# Patient Record
Sex: MALE | Race: WHITE | Employment: UNEMPLOYED | URBAN - METROPOLITAN AREA
[De-identification: names, ages, dates, MRNs, and addresses within clinical notes are randomized per-mention and may not be internally consistent; named-entity substitution may affect disease eponyms.]

---

## 2018-04-14 ENCOUNTER — HOSPITAL ENCOUNTER (EMERGENCY)
Age: 48
Discharge: HOME | End: 2018-04-14
Attending: EMERGENCY MEDICINE
Payer: COMMERCIAL

## 2018-04-14 VITALS
HEART RATE: 88 BPM | SYSTOLIC BLOOD PRESSURE: 140 MMHG | TEMPERATURE: 98.3 F | RESPIRATION RATE: 16 BRPM | DIASTOLIC BLOOD PRESSURE: 85 MMHG | OXYGEN SATURATION: 98 %

## 2018-04-14 DIAGNOSIS — F20.9 SCHIZOPHRENIA, UNSPECIFIED TYPE (HCC): Primary | ICD-10-CM

## 2018-04-14 DIAGNOSIS — Z76.0 MEDICATION REFILL: ICD-10-CM

## 2018-04-14 PROCEDURE — 99282 EMERGENCY DEPT VISIT SF MDM: CPT

## 2018-04-14 RX ORDER — HALOPERIDOL 10 MG/1
10 TABLET ORAL
COMMUNITY
End: 2018-04-14

## 2018-04-14 RX ORDER — HYDROXYZINE 50 MG/1
100 TABLET, FILM COATED ORAL EVERY EVENING
Qty: 28 TAB | Refills: 0 | Status: SHIPPED | OUTPATIENT
Start: 2018-04-14 | End: 2018-04-28

## 2018-04-14 RX ORDER — HALOPERIDOL 10 MG/1
10 TABLET ORAL
Qty: 14 TAB | Refills: 0 | Status: SHIPPED | OUTPATIENT
Start: 2018-04-14 | End: 2018-04-28

## 2018-04-14 RX ORDER — LITHIUM CARBONATE 300 MG
300 TABLET ORAL 3 TIMES DAILY
COMMUNITY
End: 2018-04-14

## 2018-04-14 RX ORDER — BENZTROPINE MESYLATE 1 MG/1
1 TABLET ORAL 2 TIMES DAILY
COMMUNITY
End: 2018-04-14

## 2018-04-14 RX ORDER — OLANZAPINE 10 MG/1
10 TABLET ORAL
Qty: 14 TAB | Refills: 0 | Status: SHIPPED | OUTPATIENT
Start: 2018-04-14 | End: 2018-05-07 | Stop reason: SDUPTHER

## 2018-04-14 RX ORDER — BENZTROPINE MESYLATE 1 MG/1
1 TABLET ORAL 2 TIMES DAILY
Qty: 28 TAB | Refills: 0 | Status: SHIPPED | OUTPATIENT
Start: 2018-04-14 | End: 2018-04-28

## 2018-04-14 RX ORDER — RANITIDINE 150 MG/1
150 TABLET, FILM COATED ORAL 2 TIMES DAILY
COMMUNITY
End: 2018-04-14

## 2018-04-14 RX ORDER — HYDROXYZINE 50 MG/1
100 TABLET, FILM COATED ORAL EVERY EVENING
COMMUNITY
End: 2018-04-14

## 2018-04-14 RX ORDER — RANITIDINE 150 MG/1
150 TABLET, FILM COATED ORAL 2 TIMES DAILY
Qty: 28 TAB | Refills: 0 | Status: SHIPPED | OUTPATIENT
Start: 2018-04-14 | End: 2018-04-28

## 2018-04-14 RX ORDER — LITHIUM CARBONATE 300 MG
300 TABLET ORAL 3 TIMES DAILY
Qty: 42 TAB | Refills: 0 | Status: SHIPPED | OUTPATIENT
Start: 2018-04-14 | End: 2018-04-28

## 2018-04-14 RX ORDER — OLANZAPINE 10 MG/1
10 TABLET ORAL
COMMUNITY
End: 2018-04-14

## 2018-04-14 NOTE — BSMART NOTE
50 yo M seen in ED room 16 at the request of MICHELE Alonso to provide outpt psychiatric provider information. Smiles when approached, alert & O x 4, accompanied by [facility] mental health counselor. Released from skilled nursing month ago with 30 day medication supply that has run out as of today. Came to ED for med refills. Denies acute symptoms. Denies suicidal or homicidal ideation, denies A / V hallucinations. Impact worker states client does not yet have medicaid or an SSI check, but that he has been approved and they intend to follow-up with a private provider at that time. Provided written and verbal suggestion of [facility] availability. Impact worker states, \"[facility] takes too long, you go to [facility] and then nothing. \" She refused written referral information. DISPOSITION: Discussed with MICHELE Alonso who will attempt to provide written [facility] information upon discharge per ED. 10 day prescriptions will be written by ED provider. Will explain to client and Impact worker that ED does not provide routine primary psychiatric care and they will need to follow-up with PCP, psychiatrist or clinic of choice.

## 2018-04-14 NOTE — DISCHARGE INSTRUCTIONS
Medicine for Schizophrenia: Care Instructions  Your Care Instructions    Medicine is the best treatment for schizophrenia. But it can be hard to take the medicine. This may be because:  · You have severe side effects. · You don't believe you are ill. · You feel better. You may think you no longer need medicine. · You forget to take your medicine. This might be because of confused thinking or depression. · You have a drug or alcohol problem that gets in the way. · You don't want to be reminded that you have a mental health problem. Taking medicine every day reminds you. But if you stop taking your medicine, you probably will have a relapse. A relapse means your symptoms return or get worse after you have been feeling better. As long as you are taking medicines, you will need to see your doctor on a regular basis. You may need to go to a hospital while you are changing or stopping medicines. Follow-up care is a key part of your treatment and safety. Be sure to make and go to all appointments, and call your doctor if you are having problems. It's also a good idea to know your test results and keep a list of the medicines you take. What medicines are used for schizophrenia? Many types of medicines can help you. It might be best to use more than one, but it may take time to find which medicines work well for you. This may be frustrating. But your doctor and family can support you during this time. Medicines used most often include:  · First-generation antipsychotics. Examples are chlorpromazine, haloperidol (Haldol), and perphenazine. They are used to reduce anxiety and agitation. They also keep you from hearing or seeing things that aren't there (hallucinations) and from believing things that aren't true (delusions). · Second-generation antipsychotics. Examples are aripiprazole (Abilify) and risperidone (Risperdal).  These medicines keep you from hearing or seeing things that aren't there (hallucinations) and from believing things that aren't true (delusions). They also help the negative symptoms, like not caring about things or finding it hard to say how you feel. These medicines may have fewer side effects than first-generation medicines. These medicines sometimes have severe side effects. Always talk to your doctor about how they are working and how you are feeling. If you feel that a medicine isn't right for you, your doctor can help you find a new one. Don't stop taking your medicines unless you talk to your doctor. How can you care for yourself at home? Take your medicine  · Be safe with medicines. Take your medicines exactly as prescribed. Call your doctor if you think you are having a problem with your medicine. · If you are having trouble taking your medicines or feel you don't need to take them, talk to your doctor. Your doctor may be able to change the medicine or the amount you take. Ask about long-acting medicines  · Ask your doctor about long-acting medicines that are injected (shots). You get a shot every week or every few weeks. This may be a good choice because:  ¨ You have a set day and time to get the shot. ¨ If you don't show up for your shot, your doctor knows right away. ¨ The medicine stays in your body longer. If you are a little late for a shot, you have more time to get help before your symptoms return. ¨ You are not reminded every day that you have a mental health problem. ¨ You don't have to carry pills with you. Have a routine  · Make a schedule for taking your medicines. Follow it every day. · Identify things you do every day at the same time, such as brushing your teeth. Use these activities to help remind you to take your medicines. · Set your watch alarm or a kitchen timer to remind you when to take your medicines. Or ask a family member to help you remember to take your medicines.   · Keep the numbers for these national suicide hotlines: 8-830-390-TALK (3-906.693.2026) and 6-787-LIXCRDY (8-520.206.8619). If you or someone you know talks about suicide or about feeling hopeless, get help right away. Use a pillbox  · Use a plastic pillbox with dividers for each day's medicines. It can have a few or many compartments. Some have timers you can program. Choose one that fits your needs. · Put your pillbox in a place where it will remind you to take your medicines. For example, if you need to take medicine 3 times a day with meals, put those medicines in a pillbox near where you eat. · Keep one pill in its original bottle. Then if you forget what a pill is for, you can find the bottle it came from. When should you call for help? Call 911 anytime you think you may need emergency care. For example, call if:  ? · You are thinking about suicide or are threatening suicide. ? · You feel you cannot stop from hurting yourself or someone else. ? · You hear voices that tell you to hurt yourself or someone else or to do something illegal, such as destroy property or steal.   ?Call your doctor now or seek immediate medical care if:  ? · You show warning signs of suicide, such as talking about death or spending long periods of time alone. ? · You hear voices. ? · You think someone is trying to harm you. ? · You cannot concentrate or are easily confused. ? · You are drinking a lot of alcohol or using illegal drugs. ? · You have a hard time taking care of basic needs, such as grooming. ? · You have signs of neuroleptic malignant syndrome, a side effect of a medicine you may be taking. Signs include:  ¨ A fever of 102°F to 103°F.  ¨ A fast or irregular heartbeat. ¨ Rapid breathing. ¨ Severe sweating. ? · You have signs of tardive dyskinesia, a side effect of a medicine you may be taking. These include:  ¨ Lip-smacking or continuous chewing. ¨ Tongue-twitching or thrusting the tongue out of the mouth. ¨ Quick and jerky movements (tics) of the head. ? Watch closely for changes in your health, and be sure to contact your doctor if:  ? · Your symptoms come back or are getting worse after you have been getting better. ? · You cannot go to your counseling sessions. ? · You are not taking your medicines or you are thinking about not taking them. Where can you learn more? Go to http://melony-venu.info/. Enter B567 in the search box to learn more about \"Medicine for Schizophrenia: Care Instructions. \"  Current as of: May 12, 2017  Content Version: 11.4  © 9299-1006 [employer]. Care instructions adapted under license by [employer] (which disclaims liability or warranty for this information). If you have questions about a medical condition or this instruction, always ask your healthcare professional. [address] any warranty or liability for your use of this information.

## 2018-04-14 NOTE — ED PROVIDER NOTES
EMERGENCY DEPARTMENT HISTORY AND PHYSICAL EXAM    9:07 AM      Date: 4/14/2018  Patient Name: Rogers Solomon    History of Presenting Illness     Chief Complaint   Patient presents with    Medication Refill         History Provided By: Patient    Chief Complaint: medication refill  Duration:  N/A  Timing:  N/A  Location: n/a  Quality: n/a  Severity: N/A  Modifying Factors: none  Associated Symptoms: denies any other associated signs or symptoms      Additional History (Context): Rogers Solomon is a 51 y.o. male with schizophrenia who presents for refill on his medications. Pt was just released from senior care and ran out of his medications today. Pt had today's dose. Pt needs Atarax, Cogentin, Zyprexa, Lithium, Haldol, Zantac. Pt is awaiting approval for Medicaid to get a PCP and outpatient psychiatrist. Pt denies any medical complaints. As the patient is without physical symptoms or complaints of pain, there is no severity of pain, quality of pain, duration, modifying factors, or associated signs and symptoms regarding the pt's presenting complaint. No other concerns or symptoms at this time. Denies suicidal ideation, hallucination, homicidal ideation. PCP: None    Current Outpatient Prescriptions   Medication Sig Dispense Refill    hydrOXYzine HCl (ATARAX) 50 mg tablet Take 100 mg by mouth every evening.  benztropine (COGENTIN) 1 mg tablet Take 1 mg by mouth two (2) times a day.  OLANZapine (ZYPREXA) 10 mg tablet Take 10 mg by mouth nightly.  lithium carbonate 300 mg tablet Take 300 mg by mouth three (3) times daily.  raNITIdine (ZANTAC) 150 mg tablet Take 150 mg by mouth two (2) times a day.  haloperidol (HALDOL) 10 mg tablet Take 10 mg by mouth nightly. Past History     Past Medical History:  Past Medical History:   Diagnosis Date    Schizophrenia [facility])        Past Surgical History:  History reviewed. No pertinent surgical history. Family History:  History reviewed. No pertinent family history. Social History:  Social History   Substance Use Topics    Smoking status: Current Every Day Smoker     Packs/day: 1.00    Smokeless tobacco: Never Used    Alcohol use No       Allergies:  No Known Allergies      Review of Systems       Review of Systems   Constitutional: Negative for fever. HENT: Negative for facial swelling. Eyes: Negative for visual disturbance. Respiratory: Negative for shortness of breath. Cardiovascular: Negative for chest pain. Gastrointestinal: Negative for abdominal pain. Genitourinary: Negative for dysuria. Musculoskeletal: Negative for neck pain. Skin: Negative for rash. Neurological: Negative for dizziness. Psychiatric/Behavioral: Negative for confusion, hallucinations and suicidal ideas. All other systems reviewed and are negative. Physical Exam   There were no vitals taken for this visit. Physical Exam   Constitutional: He appears well-developed and well-nourished. No distress. HENT:   Head: Normocephalic and atraumatic. Eyes: Conjunctivae are normal.   Neck: Normal range of motion. Neck supple. Cardiovascular: Normal rate. Pulmonary/Chest: Effort normal.   Abdominal: Soft. Musculoskeletal: Normal range of motion. Neurological: He is alert. Skin: Skin is warm and dry. He is not diaphoretic. Psychiatric: He has a normal mood and affect. His behavior is normal. Judgment and thought content normal.   Nursing note and vitals reviewed. Diagnostic Study Results     Labs -  No results found for this or any previous visit (from the past 12 hour(s)). Radiologic Studies -   No orders to display         Medical Decision Making   I am the first provider for this patient. I reviewed the vital signs, available nursing notes, past medical history, past surgical history, family history and social history. Vital Signs-Reviewed the patient's vital signs.     Records Reviewed: Nursing Notes (Time of Review: 9:07 AM)    ED Course: Progress Notes, Reevaluation, and Consults:  H/o schizophrenia, just released from nursing home, needs meds refilled until appt with psych. Does not have appt at this time. Consulted crisis to get appt with [facility] until medicaid approved. Denies SI/HI. No medical complaints. Provider Notes (Medical Decision Making):   Crisis has discussed [facility] options with patient and  but they refuse. Informed them that it is necessary to have a PCP or psychiatrist prescribe his meds and these are not typically refilled in the ED. We will refill them today, but he will need a PCP or psych appt for his next refill. Urged them to consider [facility]. Discussed treatment plan, return precautions, symptomatic relief, and expected time to improvement. All questions answered. Patient is stable for discharge and outpatient management. Diagnosis     Clinical Impression:   1. Schizophrenia, unspecified type ([facility] [address]    2. Medication refill        Disposition: home    Follow-up Information     None           Patient's Medications   Start Taking    No medications on file   Continue Taking    BENZTROPINE (COGENTIN) 1 MG TABLET    Take 1 mg by mouth two (2) times a day. HALOPERIDOL (HALDOL) 10 MG TABLET    Take 10 mg by mouth nightly. HYDROXYZINE HCL (ATARAX) 50 MG TABLET    Take 100 mg by mouth every evening. LITHIUM CARBONATE 300 MG TABLET    Take 300 mg by mouth three (3) times daily. OLANZAPINE (ZYPREXA) 10 MG TABLET    Take 10 mg by mouth nightly. RANITIDINE (ZANTAC) 150 MG TABLET    Take 150 mg by mouth two (2) times a day.    These Medications have changed    No medications on file   Stop Taking    No medications on file     _______________________________    Attestations:  [id number] [facility] acting as a scribe for and in the presence of ARIE/DEVEN Luciano      April 14, 2018 at 9:07 AM       Provider Attestation:      I personally performed the services described in the documentation, reviewed the documentation, as recorded by the scribe in my presence, and it accurately and completely records my words and actions.  April 14, 2018 at 9:07 AM - Billy Garcia PA-C    _______________________________

## 2018-05-07 ENCOUNTER — OFFICE VISIT (OUTPATIENT)
Dept: FAMILY MEDICINE CLINIC | Age: 48
End: 2018-05-07

## 2018-05-07 ENCOUNTER — HOSPITAL ENCOUNTER (OUTPATIENT)
Dept: LAB | Age: 48
Discharge: HOME | End: 2018-05-07

## 2018-05-07 VITALS
HEIGHT: 73 IN | WEIGHT: 163.8 LBS | BODY MASS INDEX: 21.71 KG/M2 | HEART RATE: 102 BPM | RESPIRATION RATE: 16 BRPM | TEMPERATURE: 97.6 F | SYSTOLIC BLOOD PRESSURE: 119 MMHG | OXYGEN SATURATION: 99 % | DIASTOLIC BLOOD PRESSURE: 72 MMHG

## 2018-05-07 DIAGNOSIS — Z76.0 MEDICATION REFILL: ICD-10-CM

## 2018-05-07 DIAGNOSIS — Z59.9 FINANCIAL DIFFICULTIES: ICD-10-CM

## 2018-05-07 DIAGNOSIS — F20.9 SCHIZOPHRENIA, UNSPECIFIED TYPE (HCC): ICD-10-CM

## 2018-05-07 DIAGNOSIS — Z76.89 ENCOUNTER TO ESTABLISH CARE: Primary | ICD-10-CM

## 2018-05-07 DIAGNOSIS — Z72.0 TOBACCO USER: ICD-10-CM

## 2018-05-07 DIAGNOSIS — Z76.89 ENCOUNTER TO ESTABLISH CARE: ICD-10-CM

## 2018-05-07 DIAGNOSIS — Z86.59 H/O BIPOLAR DISORDER: ICD-10-CM

## 2018-05-07 LAB
ALBUMIN SERPL-MCNC: 4 G/DL (ref 3.4–5)
ALBUMIN/GLOB SERPL: 1.4 {RATIO} (ref 0.8–1.7)
ALP SERPL-CCNC: 83 U/L (ref 45–117)
ALT SERPL-CCNC: 22 U/L (ref 16–61)
AMPHET UR QL SCN: NEGATIVE
ANION GAP SERPL CALC-SCNC: 7 MMOL/L (ref 3–18)
AST SERPL-CCNC: 15 U/L (ref 15–37)
BARBITURATES UR QL SCN: NEGATIVE
BASOPHILS # BLD: 0 K/UL (ref 0–0.1)
BASOPHILS NFR BLD: 0 % (ref 0–2)
BENZODIAZ UR QL: NEGATIVE
BILIRUB SERPL-MCNC: 0.6 MG/DL (ref 0.2–1)
BUN SERPL-MCNC: 9 MG/DL (ref 7–18)
BUN/CREAT SERPL: 10 (ref 12–20)
CALCIUM SERPL-MCNC: 8.3 MG/DL (ref 8.5–10.1)
CANNABINOIDS UR QL SCN: NEGATIVE
CHLORIDE SERPL-SCNC: 108 MMOL/L (ref 100–108)
CHOLEST SERPL-MCNC: 152 MG/DL
CO2 SERPL-SCNC: 27 MMOL/L (ref 21–32)
COCAINE UR QL SCN: POSITIVE
CREAT SERPL-MCNC: 0.93 MG/DL (ref 0.6–1.3)
DIFFERENTIAL METHOD BLD: ABNORMAL
EOSINOPHIL # BLD: 0.3 K/UL (ref 0–0.4)
EOSINOPHIL NFR BLD: 4 % (ref 0–5)
ERYTHROCYTE [DISTWIDTH] IN BLOOD BY AUTOMATED COUNT: 12.9 % (ref 11.6–14.5)
EST. AVERAGE GLUCOSE BLD GHB EST-MCNC: NORMAL MG/DL
ETHANOL SERPL-MCNC: <3 MG/DL (ref 0–3)
GLOBULIN SER CALC-MCNC: 2.9 G/DL (ref 2–4)
GLUCOSE SERPL-MCNC: 123 MG/DL (ref 74–99)
HBA1C MFR BLD: 4.6 % (ref 4.2–5.6)
HCT VFR BLD AUTO: 38.8 % (ref 36–48)
HDLC SERPL-MCNC: 55 MG/DL (ref 40–60)
HDLC SERPL: 2.8 {RATIO} (ref 0–5)
HDSCOM,HDSCOM: ABNORMAL
HGB BLD-MCNC: 13.8 G/DL (ref 13–16)
LDLC SERPL CALC-MCNC: 79.4 MG/DL (ref 0–100)
LIPID PROFILE,FLP: NORMAL
LITHIUM SERPL-SCNC: <0.2 MMOL/L (ref 0.6–1.2)
LYMPHOCYTES # BLD: 1.4 K/UL (ref 0.9–3.6)
LYMPHOCYTES NFR BLD: 20 % (ref 21–52)
MCH RBC QN AUTO: 31.9 PG (ref 24–34)
MCHC RBC AUTO-ENTMCNC: 35.6 G/DL (ref 31–37)
MCV RBC AUTO: 89.8 FL (ref 74–97)
METHADONE UR QL: NEGATIVE
MONOCYTES # BLD: 0.6 K/UL (ref 0.05–1.2)
MONOCYTES NFR BLD: 8 % (ref 3–10)
NEUTS SEG # BLD: 4.6 K/UL (ref 1.8–8)
NEUTS SEG NFR BLD: 68 % (ref 40–73)
OPIATES UR QL: NEGATIVE
PCP UR QL: NEGATIVE
PLATELET # BLD AUTO: 159 K/UL (ref 135–420)
PMV BLD AUTO: 9.4 FL (ref 9.2–11.8)
POTASSIUM SERPL-SCNC: 3.9 MMOL/L (ref 3.5–5.5)
PROT SERPL-MCNC: 6.9 G/DL (ref 6.4–8.2)
PSA SERPL-MCNC: 0.6 NG/ML (ref 0–4)
RBC # BLD AUTO: 4.32 M/UL (ref 4.7–5.5)
SODIUM SERPL-SCNC: 142 MMOL/L (ref 136–145)
TRIGL SERPL-MCNC: 88 MG/DL (ref ?–150)
TSH SERPL DL<=0.05 MIU/L-ACNC: 0.75 UIU/ML (ref 0.36–3.74)
VLDLC SERPL CALC-MCNC: 17.6 MG/DL
WBC # BLD AUTO: 6.8 K/UL (ref 4.6–13.2)

## 2018-05-07 PROCEDURE — 87389 HIV-1 AG W/HIV-1&-2 AB AG IA: CPT | Performed by: NURSE PRACTITIONER

## 2018-05-07 PROCEDURE — 80061 LIPID PANEL: CPT | Performed by: NURSE PRACTITIONER

## 2018-05-07 PROCEDURE — 85025 COMPLETE CBC W/AUTO DIFF WBC: CPT | Performed by: NURSE PRACTITIONER

## 2018-05-07 PROCEDURE — 80178 ASSAY OF LITHIUM: CPT | Performed by: NURSE PRACTITIONER

## 2018-05-07 PROCEDURE — 87661 TRICHOMONAS VAGINALIS AMPLIF: CPT | Performed by: NURSE PRACTITIONER

## 2018-05-07 PROCEDURE — 84443 ASSAY THYROID STIM HORMONE: CPT | Performed by: NURSE PRACTITIONER

## 2018-05-07 PROCEDURE — 83036 HEMOGLOBIN GLYCOSYLATED A1C: CPT | Performed by: NURSE PRACTITIONER

## 2018-05-07 PROCEDURE — 80074 ACUTE HEPATITIS PANEL: CPT | Performed by: NURSE PRACTITIONER

## 2018-05-07 PROCEDURE — 80307 DRUG TEST PRSMV CHEM ANLYZR: CPT | Performed by: NURSE PRACTITIONER

## 2018-05-07 PROCEDURE — 80053 COMPREHEN METABOLIC PANEL: CPT | Performed by: NURSE PRACTITIONER

## 2018-05-07 PROCEDURE — 84153 ASSAY OF PSA TOTAL: CPT | Performed by: NURSE PRACTITIONER

## 2018-05-07 RX ORDER — CALCIUM POLYCARBOPHIL 625 MG
625 TABLET ORAL 2 TIMES DAILY
COMMUNITY
End: 2019-08-09

## 2018-05-07 RX ORDER — LITHIUM CARBONATE 300 MG/1
600 CAPSULE ORAL DAILY
COMMUNITY
End: 2018-05-07 | Stop reason: SDUPTHER

## 2018-05-07 RX ORDER — HALOPERIDOL 20 MG/1
20 TABLET ORAL
Qty: 30 TAB | Refills: 0 | Status: SHIPPED | OUTPATIENT
Start: 2018-05-07 | End: 2019-08-09

## 2018-05-07 RX ORDER — BENZTROPINE MESYLATE 1 MG/1
1 TABLET ORAL EVERY EVENING
Qty: 30 TAB | Refills: 0 | Status: SHIPPED | OUTPATIENT
Start: 2018-05-07 | End: 2019-08-09

## 2018-05-07 RX ORDER — HALOPERIDOL 10 MG/1
20 TABLET ORAL DAILY
COMMUNITY
End: 2018-05-07 | Stop reason: SDUPTHER

## 2018-05-07 RX ORDER — HYDROXYZINE 50 MG/1
100 TABLET, FILM COATED ORAL DAILY
COMMUNITY
End: 2018-05-07 | Stop reason: SDUPTHER

## 2018-05-07 RX ORDER — OLANZAPINE 10 MG/1
10 TABLET ORAL
Qty: 90 TAB | Refills: 3 | Status: ON HOLD | COMMUNITY
Start: 2018-05-07 | End: 2019-08-09 | Stop reason: SDUPTHER

## 2018-05-07 RX ORDER — OLANZAPINE 10 MG/1
10 TABLET ORAL
Qty: 90 TAB | Refills: 0 | Status: SHIPPED | COMMUNITY
Start: 2018-05-07 | End: 2019-08-09

## 2018-05-07 RX ORDER — BENZTROPINE MESYLATE 1 MG/1
TABLET ORAL DAILY
COMMUNITY
End: 2018-05-07 | Stop reason: SDUPTHER

## 2018-05-07 RX ORDER — RANITIDINE 150 MG/1
150 TABLET, FILM COATED ORAL DAILY
COMMUNITY
End: 2019-08-09

## 2018-05-07 RX ORDER — LITHIUM CARBONATE 300 MG/1
600 CAPSULE ORAL EVERY EVENING
Qty: 60 CAP | Refills: 0 | Status: SHIPPED | OUTPATIENT
Start: 2018-05-07 | End: 2019-08-09

## 2018-05-07 RX ORDER — HYDROXYZINE 50 MG/1
100 TABLET, FILM COATED ORAL EVERY EVENING
Qty: 60 TAB | Refills: 0 | Status: SHIPPED | OUTPATIENT
Start: 2018-05-07 | End: 2019-08-09

## 2018-05-07 SDOH — ECONOMIC STABILITY - INCOME SECURITY: PROBLEM RELATED TO HOUSING AND ECONOMIC CIRCUMSTANCES, UNSPECIFIED: Z59.9

## 2018-05-07 NOTE — PROGRESS NOTES
[address]  [facility], [address] [facility]  [phone] office/[phone] fax      5/7/2018    Reason for visit:   Chief Complaint   Patient presents with    Establish Care    Bipolar    Behavioral Problem     schizophrenia dx at age 7 been lithium 21 years, haldol 15 years, zyprexa ~ unknown    Other     schizophrenia    Medication Refill    Depression    Other     Released from [facility] x 2 weeks ago and served 4 years of 5 year senior living term. Has 30 day supply of meds       Patient: Praful Coleman, 1970, [id number]       Primary MD: Manny Bro NP    Subjective:   Praful Coelman, a 51 y.o. male with pmhx of polysubstance abuse, Schizophrenia, Bipolar, and Tobacco use who presents today to establish [facility]. The patient was released from senior living 2 weeks ago after serving 4 years. He presents today needing refills of his medications, however he cannot afford them. Pt reports he has tried to contact [facility] to see a psychiatrist but they are not accepting patients at this time. He had a psychiatrist in senior living but lacks follow up outside due to accessibility. He presents today for a rrefill. He had been doing well on his previously prescribed Lithium, Haldol, Atarax, Zyprexa and congentin. Congentin was added to his regimen given he was having numbness in his hands, denies this at this time or any other side effects from medications. He reports symptoms were stable on meds but since being off he has been hearing more voices telling him \"He's no good and to kill himself\", he also reports visual hallucinations in addition to the auditory hallucinations. He reports sleep not being adequate, but has good appetite. He lives with his roommate and does not work as he is on disability. He has a fixed income and reports low dispensable income after paying his rent.  He denies any current SI/HI and reports he is aware to go to ED for any emergency situations or if he feels suicidal.     Records from halfway were reviewed to make sure med list was accurate. HPI    PHQ over the last two weeks 5/7/2018   PHQ Not Done Active Diagnosis of Depression or Bipolar Disorder   Little interest or pleasure in doing things Several days   Feeling down, depressed or hopeless Several days   Total Score PHQ 2 2       Past Medical History:   Diagnosis Date    ADHD     Bipolar affective ([facility]     Schizophrenia ([facility]     Smoker        History reviewed. No pertinent surgical history. Social History     Social History    Marital status: SINGLE     Spouse name: N/A    Number of children: 0    Years of education: GED     Occupational History    Unemployed      Social History Main Topics    Smoking status: Current Every Day Smoker     Packs/day: 1.00    Smokeless tobacco: Never Used    Alcohol use No    Drug use: No    Sexual activity: Not Currently     Partners: Female     Other Topics Concern     Service No    Blood Transfusions No    Caffeine Concern No    Occupational Exposure No    Hobby Hazards No    Sleep Concern No     only problem sleeping when out of medication    Stress Concern No    Weight Concern No    Special Diet No    Back Care No    Exercise Yes     \"I walk everywhere\"    Bike Helmet No    Seat Belt Yes    Self-Exams No     Social History Narrative    Lives with roomate       No Known Allergies    No current outpatient prescriptions on file prior to visit. No current facility-administered medications on file prior to visit. Review of Systems   Constitutional: Negative. HENT: Negative. Eyes: Negative. Respiratory: Negative. Cardiovascular: Negative. Gastrointestinal: Negative. Genitourinary: Negative. Musculoskeletal: Negative. Skin: Negative. Neurological: Negative. Endo/Heme/Allergies: Negative. Psychiatric/Behavioral: Positive for depression and hallucinations.  Negative for memory loss, substance abuse and suicidal ideas. The patient has insomnia. The patient is not nervous/anxious. Objective:   Visit Vitals    /72 (BP 1 Location: Left arm, BP Patient Position: Sitting)    Pulse (!) 102    Temp 97.6 °F (36.4 °C) (Oral)    Resp 16    Ht 6' 1\" (1.854 m)    Wt 163 lb 12.8 oz (74.3 kg)    SpO2 99%    BMI 21.61 kg/m2      Wt Readings from Last 3 Encounters:   05/07/18 163 lb 12.8 oz (74.3 kg)     No results found for: GLU, GLUCPOC    Physical Exam   Constitutional: He is oriented to person, place, and time. He appears well-developed and well-nourished. HENT:   Head: Normocephalic. Eyes: Pupils are equal, round, and reactive to light. Neck: Normal range of motion. Neck supple. No JVD present. Carotid bruit is not present. No thyromegaly present. Cardiovascular: Normal rate and regular rhythm. No murmur heard. Pulmonary/Chest: Effort normal and breath sounds normal. No respiratory distress. He has no wheezes. Abdominal: Soft. Bowel sounds are normal. He exhibits no distension. There is no tenderness. Musculoskeletal: Normal range of motion. He exhibits no edema or deformity. Neurological: He is alert and oriented to person, place, and time. He has normal reflexes. Skin: Skin is warm and dry. Psychiatric: He has a normal mood and affect. His speech is normal and behavior is normal. Judgment and thought content normal. Cognition and memory are normal.   Vitals reviewed. Assessment:    Aure Chandler who has risk factors including (see above previous medical hx) and:       ICD-10-CM ICD-9-CM    1.  Encounter to establish care Z76.89 V65.8 DRUG SCREEN, URINE      TSH 3RD GENERATION      LIPID PANEL      HEPATITIS PANEL, ACUTE      CBC WITH AUTOMATED DIFF      METABOLIC PANEL, COMPREHENSIVE      PSA, DIAGNOSTIC (PROSTATE SPECIFIC AG)      HIV 1/2 AG/AB, 4TH GENERATION,W RFLX CONFIRM      ETHYL ALCOHOL      CHLAMYDIA/NEISSERIA/TRICHOMONAS AMP HEMOGLOBIN A1C WITH EAG      LITHIUM   2. Schizophrenia, unspecified type (HCC) F20.9 295.90 REFERRAL TO PSYCHIATRY      REFERRAL TO BEHAVIORAL HEALTH      OLANZapine (ZYPREXA) 10 mg tablet      OLANZapine (ZYPREXA) 10 mg tablet      lithium carbonate 300 mg capsule      hydrOXYzine HCl (ATARAX) 50 mg tablet      haloperidol (HALDOL) 20 mg tablet      benztropine (COGENTIN) 1 mg tablet   3. H/O bipolar disorder Z86.59 V11.1 REFERRAL TO PSYCHIATRY      REFERRAL TO BEHAVIORAL HEALTH      OLANZapine (ZYPREXA) 10 mg tablet      OLANZapine (ZYPREXA) 10 mg tablet      lithium carbonate 300 mg capsule      hydrOXYzine HCl (ATARAX) 50 mg tablet      haloperidol (HALDOL) 20 mg tablet      benztropine (COGENTIN) 1 mg tablet   4. Medication refill Z76.0 V68.1 OLANZapine (ZYPREXA) 10 mg tablet      OLANZapine (ZYPREXA) 10 mg tablet      lithium carbonate 300 mg capsule      hydrOXYzine HCl (ATARAX) 50 mg tablet      haloperidol (HALDOL) 20 mg tablet      benztropine (COGENTIN) 1 mg tablet   5. Tobacco user Z72.0 305.1    6. Financial difficulties Z59.8 V60.2    1. Encounter to establish care  - DRUG SCREEN, URINE; Future  - TSH 3RD GENERATION; Future  - LIPID PANEL; Future  - HEPATITIS PANEL, ACUTE; Future  - CBC WITH AUTOMATED DIFF; Future  - METABOLIC PANEL, COMPREHENSIVE; Future  - PROSTATE SPECIFIC AG; Future  - HIV 1/2 AG/AB, 4TH GENERATION,W RFLX CONFIRM; Future  - ETHYL ALCOHOL; Future  - CHLAMYDIA/NEISSERIA/TRICHOMONAS AMP; Future  - HEMOGLOBIN A1C WITH EAG; Future  - LITHIUM; Future    2.  Schizophrenia, unspecified type [facility])  The patient was identified to have a behavioral issue and counseling was suggested and verbal and written instruction was provided on how to seek assistance for any urgent issues away from the office such as contacting the Suicide Prevention Hotline, contacting the local [employer] in [place], contacting the local police department or coming directly to [facility] Department M-F 8am to 5pm or the Emergency Department 24 hours a day. The patient was also informed of our in house mental health Nurse Practitioner Ms. Joe Kwok that is available for counseling and medication management. The patient understands that any medications provided to them for behavioral issues are with the request that behavioral counseling also commence for the synergistic effect to reduce the symptoms and to provide coping skills that are currently lacking.   - The patient to f/u with In house mental health NP for counseling while continuing to try to get into [facility]   - Given information on GAP and APA  - REFERRAL TO PSYCHIATRY  - REFERRAL TO BEHAVIORAL HEALTH  - OLANZapine (ZYPREXA) 10 mg tablet; Take 1 Tab by mouth nightly. Dispense: 90 Tab; Refill: 3  - OLANZapine (ZYPREXA) 10 mg tablet; Take 1 Tab by mouth nightly. Dispense: 90 Tab; Refill: 0  - lithium carbonate 300 mg capsule; Take 2 Caps by mouth every evening. Dispense: 60 Cap; Refill: 0  - hydrOXYzine HCl (ATARAX) 50 mg tablet; Take 2 Tabs by mouth every evening. Dispense: 60 Tab; Refill: 0  - haloperidol (HALDOL) 20 mg tablet; Take 1 Tab by mouth nightly. Dispense: 30 Tab; Refill: 0  - benztropine (COGENTIN) 1 mg tablet; Take 1 Tab by mouth every evening. Dispense: 30 Tab; Refill: 0    3. H/O bipolar disorder  - REFERRAL TO PSYCHIATRY  - REFERRAL TO BEHAVIORAL HEALTH  - OLANZapine (ZYPREXA) 10 mg tablet; Take 1 Tab by mouth nightly. Dispense: 90 Tab; Refill: 3  - OLANZapine (ZYPREXA) 10 mg tablet; Take 1 Tab by mouth nightly. Dispense: 90 Tab; Refill: 0  - lithium carbonate 300 mg capsule; Take 2 Caps by mouth every evening. Dispense: 60 Cap; Refill: 0  - hydrOXYzine HCl (ATARAX) 50 mg tablet; Take 2 Tabs by mouth every evening. Dispense: 60 Tab; Refill: 0  - haloperidol (HALDOL) 20 mg tablet; Take 1 Tab by mouth nightly. Dispense: 30 Tab; Refill: 0  - benztropine (COGENTIN) 1 mg tablet;  Take 1 Tab by mouth every evening. Dispense: 30 Tab; Refill: 0    4. Medication refill  - Given 30 day supply of refills until seen by mental health provider  - Lithium level obtained. - OLANZapine (ZYPREXA) 10 mg tablet; Take 1 Tab by mouth nightly. Dispense: 90 Tab; Refill: 3  - OLANZapine (ZYPREXA) 10 mg tablet; Take 1 Tab by mouth nightly. Dispense: 90 Tab; Refill: 0  - lithium carbonate 300 mg capsule; Take 2 Caps by mouth every evening. Dispense: 60 Cap; Refill: 0  - hydrOXYzine HCl (ATARAX) 50 mg tablet; Take 2 Tabs by mouth every evening. Dispense: 60 Tab; Refill: 0  - haloperidol (HALDOL) 20 mg tablet; Take 1 Tab by mouth nightly. Dispense: 30 Tab; Refill: 0  - benztropine (COGENTIN) 1 mg tablet; Take 1 Tab by mouth every evening. Dispense: 30 Tab; Refill: 0    5. Tobacco user  -Counseled patient on the dangers of tobacco use, and was advised to quit. Reviewed strategies to maximize success, including the use of Chantix. Discussed the risks of continued tobacco use such as elevated blood pressure, vascular irritation with increased incidence of CVD with stroke or MI and PVD causing claudication, lung damage that could lead to COPD, cancer and death. Encouraged an approach to find a few healthy habits, write them down then plan to decrease their cigarette use by one each week till they are gone all together and to plan for stress that may cause them to want to restart and how to prevent it by having new coping mechanisms in place. 1-800-QUIT-NOW provided for counseling       6. Financial difficulties  - Given one time [employer] voucher to cover 30 days worth of medications. Discussed with patient that we are not able to do this on a regular basis. Written instructions followed our verbal discussion of all information discussed above, pending tests ordered and future goals/plans.  Patient expressed understanding of current diagnosis, planned testing, follow up and if needed to contact the office for any questions or concerns prior to the next visit. Plan:   Reviewed medication and completed the medication reconciliation with the patient. Reviewed side effects of medications with the patient. Questions were answered and patient verb understanding. Pt is a 50 yo  male. See Med hx for details. Pt in the office today to establish care, medication reconciliation. Labs obtained to establish baseline, evaluate metabolic health, nutritional status, vitamin deficiencies and screening for at risk items based on the demographics of the patient, previous medical history and current social practices.  Will contact the patient in when all labs are resulted by phone to review and make lifestyle and medication recommendations. Follow up labs will be completed to monitor improvement prior to their next visit.       Orders Placed This Encounter    DRUG SCREEN, URINE     Standing Status:   Future     Standing Expiration Date:   11/6/2018    TSH 3RD GENERATION     Standing Status:   Future     Number of Occurrences:   1     Standing Expiration Date:   11/6/2018    LIPID PANEL     Standing Status:   Future     Number of Occurrences:   1     Standing Expiration Date:   5/8/2019    HEPATITIS PANEL, ACUTE     Standing Status:   Future     Number of Occurrences:   1     Standing Expiration Date:   11/6/2018    CBC WITH AUTOMATED DIFF     Standing Status:   Future     Number of Occurrences:   1     Standing Expiration Date:   38/9/7247    METABOLIC PANEL, COMPREHENSIVE     Standing Status:   Future     Number of Occurrences:   1     Standing Expiration Date:   11/6/2018    PROSTATE SPECIFIC AG     Standing Status:   Future     Number of Occurrences:   1     Standing Expiration Date:   11/6/2018    HIV 1/2 AG/AB, 4TH GENERATION,W RFLX CONFIRM     Standing Status:   Future     Number of Occurrences:   1     Standing Expiration Date:   11/6/2018    ETHYL ALCOHOL     Standing Status:   Future     Number of Occurrences:   1     Standing Expiration Date:   11/6/2018    CHLAMYDIA/NEISSERIA/TRICHOMONAS AMP     Standing Status:   Future     Number of Occurrences:   1     Standing Expiration Date:   11/7/2018     Order Specific Question:   Specimen type/source     Answer:   Urine [258]    HEMOGLOBIN A1C WITH EAG     Standing Status:   Future     Number of Occurrences:   1     Standing Expiration Date:   5/8/2019    LITHIUM     Standing Status:   Future     Number of Occurrences:   1     Standing Expiration Date:   11/7/2018    REFERRAL TO PSYCHIATRY     Referral Priority:   Routine     Referral Type:   Behavioral Health     Referral Reason:   Specialty Services Required     Referred to Provider:   Aaron Dubose NP    REFERRAL TO BEHAVIORAL HEALTH     Referral Priority:   Routine     Referral Type:   Behavioral Health     Referral Reason:   Specialty Services Required     Requested Specialty:   Behavioral Health    OLANZapine (ZYPREXA) 10 mg tablet     Sig: Take 1 Tab by mouth nightly. Dispense:  90 Tab     Refill:  3    OLANZapine (ZYPREXA) 10 mg tablet     Sig: Take 1 Tab by mouth nightly. Dispense:  90 Tab     Refill:  0    lithium carbonate 300 mg capsule     Sig: Take 2 Caps by mouth every evening. Dispense:  60 Cap     Refill:  0    hydrOXYzine HCl (ATARAX) 50 mg tablet     Sig: Take 2 Tabs by mouth every evening. Dispense:  60 Tab     Refill:  0    haloperidol (HALDOL) 20 mg tablet     Sig: Take 1 Tab by mouth nightly. Dispense:  30 Tab     Refill:  0    benztropine (COGENTIN) 1 mg tablet     Sig: Take 1 Tab by mouth every evening. Dispense:  30 Tab     Refill:  0     Current Outpatient Prescriptions   Medication Sig Dispense Refill    calcium polycarbophil (FIBER-LAX) 625 mg tablet Take 625 mg by mouth two (2) times a day.  raNITIdine (ZANTAC) 150 mg tablet Take 150 mg by mouth daily.  OLANZapine (ZYPREXA) 10 mg tablet Take 1 Tab by mouth nightly.  90 Tab 3    OLANZapine (ZYPREXA) 10 mg tablet Take 1 Tab by mouth nightly. 90 Tab 0    lithium carbonate 300 mg capsule Take 2 Caps by mouth every evening. 60 Cap 0    hydrOXYzine HCl (ATARAX) 50 mg tablet Take 2 Tabs by mouth every evening. 60 Tab 0    haloperidol (HALDOL) 20 mg tablet Take 1 Tab by mouth nightly. 30 Tab 0    benztropine (COGENTIN) 1 mg tablet Take 1 Tab by mouth every evening. 30 Tab 0     Medications Discontinued During This Encounter   Medication Reason    OLANZapine (ZYPREXA) 10 mg tablet Reorder    lithium carbonate 300 mg capsule Reorder    hydrOXYzine HCl (ATARAX) 50 mg tablet Reorder    haloperidol (HALDOL) 10 mg tablet Reorder    benztropine (COGENTIN) 1 mg tablet Reorder       Follow-up Disposition:  Return in about 1 month (around 6/7/2018), or if symptoms worsen or fail to improve. See APA for financial assistance  Labs needed for follow-up appt      Antonette Atkins MSN, RN, FNP-C     [facility]    I spent 35 minutes with the patient in face-to-face consultation, of which greater than 50% was spent in counseling and coordination of care as described above.

## 2018-05-07 NOTE — MR AVS SNAPSHOT
[address] [facility] [address] [phone] Patient: Moon Pete MRN: [id number] Visit Information Date & Time Provider Department Dept. Phone Encounter #  
 2018  1:30 PM Melody Connell NP  [facility] [id number] Your Appointments 2018 11:00 AM  
CONSULT with Herminia Pablo NP [address] [facility] ([address]) Appt Note: Biopolar/schizopherena [address] [facility] [address]  
[address] [facility] [address]  
  
    
 2018 10:30 AM  
Follow Up with Melody Connell NP [address] [facility] ([address]) Appt Note: 3 mth follow up  
 [address] [facility] [address]  
[address] [facility] [address] Upcoming Health Maintenance Date Due Pneumococcal 19-64 Medium Risk (1 of 1 - PPSV23) 1988 DTaP/Tdap/Td series (1 - Tdap) 1990 Influenza Age 5 to Adult 2018 Allergies as of 2018  Review Complete On: 2018 By: Corey Enriquez. Antonio Barraza LPN No Known Allergies Current Immunizations  Never Reviewed No immunizations on file. Not reviewed this visit You Were Diagnosed With   
  
 Codes Comments Encounter to establish care    -  Primary ICD-10-CM: Z76.89 
ICD-9-CM: V65.8 Schizophrenia, unspecified type ([facility]     ICD-10-CM: F20.9 ICD-9-CM: 295.90 H/O bipolar disorder     ICD-10-CM: Z86.59 
ICD-9-CM: V11.1 Medication refill     ICD-10-CM: Z76.0 ICD-9-CM: V68.1 Tobacco user     ICD-10-CM: Z72.0 ICD-9-CM: 305.1 Financial difficulties     ICD-10-CM: Z59.8 ICD-9-CM: V60.2 Vitals BP Pulse Temp Resp Height(growth percentile) Weight(growth percentile) 119/72 (BP 1 Location: Left arm, BP Patient Position: Sitting) (!) 102 97.6 °F (36.4 °C) (Oral) 16 6' 1\" (1.854 m) 163 lb 12.8 oz (74.3 kg) SpO2 BMI Smoking Status 99% 21.61 kg/m2 Current Every Day Smoker Vitals History BMI and BSA Data Body Mass Index Body Surface Area  
 21.61 kg/m 2 1.96 m 2 Your Updated Medication List  
  
   
This list is accurate as of 5/7/18  1:56 PM.  Always use your most recent med list.  
  
  
  
  
 benztropine 1 mg tablet Commonly known as:  COGENTIN Take 1 Tab by mouth every evening. FIBER- mg tablet Generic drug:  calcium polycarbophil Take 625 mg by mouth two (2) times a day.  
  
 haloperidol 20 mg tablet Commonly known as:  HALDOL Take 1 Tab by mouth nightly. hydrOXYzine HCl 50 mg tablet Commonly known as:  ATARAX Take 2 Tabs by mouth every evening. lithium carbonate 300 mg capsule Take 2 Caps by mouth every evening. * OLANZapine 10 mg tablet Commonly known as:  ZyPREXA Take 1 Tab by mouth nightly. * OLANZapine 10 mg tablet Commonly known as:  ZyPREXA Take 1 Tab by mouth nightly. raNITIdine 150 mg tablet Commonly known as:  ZANTAC Take 150 mg by mouth daily. * Notice: This list has 2 medication(s) that are the same as other medications prescribed for you. Read the directions carefully, and ask your doctor or other care provider to review them with you. Prescriptions Printed Refills OLANZapine (ZYPREXA) 10 mg tablet 3 Sig: Take 1 Tab by mouth nightly. Class: Program  
 Route: Oral  
 lithium carbonate 300 mg capsule 0 Sig: Take 2 Caps by mouth every evening. Class: Print Route: Oral  
 hydrOXYzine HCl (ATARAX) 50 mg tablet 0 Sig: Take 2 Tabs by mouth every evening. Class: Print Route: Oral  
 haloperidol (HALDOL) 20 mg tablet 0 Sig: Take 1 Tab by mouth nightly. Class: Print  Route: Oral  
 benztropine (COGENTIN) 1 mg tablet 0 Sig: Take 1 Tab by mouth every evening. Class: Print Route: Oral  
  
Prescriptions Sent to Mail Order Refills OLANZapine (ZYPREXA) 10 mg tablet 3 Sig: Take 1 Tab by mouth nightly. Class: Program  
 Route: Oral  
  
Prescriptions Sent to Pharmacy Refills OLANZapine (ZYPREXA) 10 mg tablet 3 Sig: Take 1 Tab by mouth nightly. Class: Program  
 Route: Oral  
  
We Performed the Following REFERRAL TO BEHAVIORAL HEALTH [REF8 Custom] Comments:  
 Please evaluate patient for Schizophrenia and Bipolar. REFERRAL TO PSYCHIATRY [REF91 Custom] Comments:  
 Please evaluate patient for interim care for Schizophrenia and Bipolar until the patient gets into [facility]. Referral Information Referral ID Referred By Referred To  
  
 [id number] JEFFREY, [address] [place] [address], NP   
   [address] [facility] [place], [address] Phone: [phone] Fax: [phone] Visits Status Start Date End Date 1 New Request 5/7/18 5/7/19 If your referral has a status of pending review or denied, additional information will be sent to support the outcome of this decision. Referral ID Referred By Referred To  
 [id number] Will BENZ Not Available Visits Status Start Date End Date 1 New Request 5/7/18 5/7/19 If your referral has a status of pending review or denied, additional information will be sent to support the outcome of this decision. Patient Instructions Dany Stephen 95. If you are interested in obtaining services, please contact:  
[facility] at [phone] and/or walk in Monday-Friday, 8:00 a.m.  5:00 p.m. for screening [at the [address] located at [address] [place] [address]. Emergency Services If experiencing a psychiatric emergency with thoughts of harming self and/or others, please call:  
Crisis Hotline at [phone] immediately. (Emergency services are available 24 hours a day/7 days a week.) Introducing [facility]! Michael Smith introduces [employer] patient portal. Now you can access parts of your medical record, email your doctor's office, and request medication refills online. 1. In your internet browser, go to [email or web address] 2. Click on the First Time User? Click Here link in the Sign In box. You will see the New Member Sign Up page. 3. Enter your [employer] Access Code exactly as it appears below. You will not need to use this code after youve completed the sign-up process. If you do not sign up before the expiration date, you must request a new code. · [employer] Access Code: [email or web address] Expires: 7/13/2018  8:53 AM 
 
4. Enter the last four digits of your Social Security Number (xxxx) and Date of Birth (mm/dd/yyyy) as indicated and click Submit. You will be taken to the next sign-up page. 5. Create a [employer] ID. This will be your [employer] login ID and cannot be changed, so think of one that is secure and easy to remember. 6. Create a [employer] password. You can change your password at any time. 7. Enter your Password Reset Question and Answer. This can be used at a later time if you forget your password. 8. Enter your e-mail address. You will receive e-mail notification when new information is available in [address] 9. Click Sign Up. You can now view and download portions of your medical record. 10. Click the Download Summary menu link to download a portable copy of your medical information. If you have questions, please visit the Frequently Asked Questions section of the [employer] website. Remember, [employer] is NOT to be used for urgent needs. For medical emergencies, dial 911. Now available from your iPhone and Android! Please provide this summary of care documentation to your next provider. Your primary care clinician is listed as Julian Carey. If you have any questions after today's visit, please call [phone].

## 2018-05-08 LAB
HAV IGM SER QL: NEGATIVE
HBV CORE IGM SER QL: NEGATIVE
HBV SURFACE AG SER QL: <0.1 INDEX
HBV SURFACE AG SER QL: NEGATIVE
HCV AB SER IA-ACNC: 0.02 INDEX
HCV AB SERPL QL IA: NEGATIVE
HCV COMMENT,HCGAC: NORMAL
SP1: NORMAL
SP2: NORMAL
SP3: NORMAL

## 2018-05-09 LAB
HIV 1+2 AB+HIV1 P24 AG SERPL QL IA: NONREACTIVE
HIV12 RESULT COMMENT, HHIVC: NORMAL

## 2018-05-10 LAB
C TRACH RRNA SPEC QL NAA+PROBE: NEGATIVE
N GONORRHOEA RRNA SPEC QL NAA+PROBE: NEGATIVE
SPECIMEN SOURCE: NORMAL
T VAGINALIS RRNA SPEC QL NAA+PROBE: NEGATIVE

## 2018-05-10 NOTE — PROGRESS NOTES
Given results as requested. Admits to doing cocaine and is aware of possible outcomes. Very thankful for phone call.

## 2018-05-22 ENCOUNTER — TELEPHONE (OUTPATIENT)
Dept: FAMILY MEDICINE CLINIC | Age: 48
End: 2018-05-22

## 2018-05-22 NOTE — TELEPHONE ENCOUNTER
Medication: zyprexa 10 mg , dose: 10 mg, how often: qhs , current number of medication days provided: 90, refill per application. Lot #: [id number], EXP 12/2018  . This medication was received and verified for the following 1. Correct Patient, 2. Correct Diagnosis, 3. Correct Drug, 4. Correct route, and no current allergy to medication. Please contact patient to come  their medications.      Dinorah Urena, MSN, RN, [facility]

## 2018-07-26 ENCOUNTER — DOCUMENTATION ONLY (OUTPATIENT)
Dept: FAMILY MEDICINE CLINIC | Age: 48
End: 2018-07-26

## 2018-07-26 NOTE — PROGRESS NOTES
Called pt to let him know that he will not be needing labs for his appt on 08/06/18. Pt informed me  that he now has Medicaid and will be going to a new provider. He will check with his insurance to see who he can see.

## 2018-08-13 ENCOUNTER — HOSPITAL ENCOUNTER (OUTPATIENT)
Dept: VASCULAR SURGERY | Age: 48
Discharge: HOME | End: 2018-08-13
Attending: NURSE PRACTITIONER
Payer: MEDICAID

## 2018-08-13 DIAGNOSIS — M79.89 RIGHT LEG SWELLING: ICD-10-CM

## 2018-08-13 PROCEDURE — 93971 EXTREMITY STUDY: CPT

## 2019-05-11 ENCOUNTER — APPOINTMENT (OUTPATIENT)
Dept: GENERAL RADIOLOGY | Age: 49
DRG: 139 | End: 2019-05-11
Attending: EMERGENCY MEDICINE
Payer: MEDICAID

## 2019-05-11 ENCOUNTER — HOSPITAL ENCOUNTER (EMERGENCY)
Age: 49
Discharge: HOME | DRG: 139 | End: 2019-05-11
Attending: EMERGENCY MEDICINE
Payer: MEDICAID

## 2019-05-11 VITALS
DIASTOLIC BLOOD PRESSURE: 54 MMHG | BODY MASS INDEX: 19.35 KG/M2 | SYSTOLIC BLOOD PRESSURE: 117 MMHG | RESPIRATION RATE: 18 BRPM | HEART RATE: 90 BPM | TEMPERATURE: 99.4 F | WEIGHT: 146 LBS | HEIGHT: 73 IN | OXYGEN SATURATION: 99 %

## 2019-05-11 DIAGNOSIS — R07.89 CHEST WALL PAIN: Primary | ICD-10-CM

## 2019-05-11 LAB
ALBUMIN SERPL-MCNC: 2.7 G/DL (ref 3.4–5)
ALBUMIN/GLOB SERPL: 1 {RATIO} (ref 0.8–1.7)
ALP SERPL-CCNC: 73 U/L (ref 45–117)
ALT SERPL-CCNC: 33 U/L (ref 16–61)
ANION GAP SERPL CALC-SCNC: 5 MMOL/L (ref 3–18)
APPEARANCE UR: CLEAR
AST SERPL-CCNC: 50 U/L (ref 15–37)
ATRIAL RATE: 99 BPM
BASOPHILS # BLD: 0 K/UL (ref 0–0.1)
BASOPHILS NFR BLD: 0 % (ref 0–2)
BILIRUB SERPL-MCNC: 1 MG/DL (ref 0.2–1)
BILIRUB UR QL: NEGATIVE
BUN SERPL-MCNC: 12 MG/DL (ref 7–18)
BUN/CREAT SERPL: 12 (ref 12–20)
CALCIUM SERPL-MCNC: 8.2 MG/DL (ref 8.5–10.1)
CALCULATED P AXIS, ECG09: 75 DEGREES
CALCULATED R AXIS, ECG10: 80 DEGREES
CALCULATED T AXIS, ECG11: 75 DEGREES
CHLORIDE SERPL-SCNC: 109 MMOL/L (ref 100–108)
CO2 SERPL-SCNC: 25 MMOL/L (ref 21–32)
COLOR UR: YELLOW
CREAT SERPL-MCNC: 0.99 MG/DL (ref 0.6–1.3)
DIAGNOSIS, 93000: NORMAL
DIFFERENTIAL METHOD BLD: ABNORMAL
EOSINOPHIL # BLD: 0 K/UL (ref 0–0.4)
EOSINOPHIL NFR BLD: 0 % (ref 0–5)
ERYTHROCYTE [DISTWIDTH] IN BLOOD BY AUTOMATED COUNT: 13.9 % (ref 11.6–14.5)
GLOBULIN SER CALC-MCNC: 2.6 G/DL (ref 2–4)
GLUCOSE SERPL-MCNC: 130 MG/DL (ref 74–99)
GLUCOSE UR STRIP.AUTO-MCNC: NEGATIVE MG/DL
HCT VFR BLD AUTO: 36.2 % (ref 36–48)
HGB BLD-MCNC: 13 G/DL (ref 13–16)
HGB UR QL STRIP: NEGATIVE
KETONES UR QL STRIP.AUTO: NEGATIVE MG/DL
LACTATE BLD-SCNC: 2.27 MMOL/L (ref 0.4–2)
LEUKOCYTE ESTERASE UR QL STRIP.AUTO: NEGATIVE
LYMPHOCYTES # BLD: 0.2 K/UL (ref 0.9–3.6)
LYMPHOCYTES NFR BLD: 2 % (ref 21–52)
MCH RBC QN AUTO: 33.6 PG (ref 24–34)
MCHC RBC AUTO-ENTMCNC: 35.9 G/DL (ref 31–37)
MCV RBC AUTO: 93.5 FL (ref 74–97)
MONOCYTES # BLD: 0.4 K/UL (ref 0.05–1.2)
MONOCYTES NFR BLD: 4 % (ref 3–10)
NEUTS SEG # BLD: 9.9 K/UL (ref 1.8–8)
NEUTS SEG NFR BLD: 94 % (ref 40–73)
NITRITE UR QL STRIP.AUTO: NEGATIVE
P-R INTERVAL, ECG05: 158 MS
PH UR STRIP: 7 [PH] (ref 5–8)
PLATELET # BLD AUTO: 124 K/UL (ref 135–420)
PMV BLD AUTO: 9.4 FL (ref 9.2–11.8)
POTASSIUM SERPL-SCNC: 4.7 MMOL/L (ref 3.5–5.5)
PROT SERPL-MCNC: 5.3 G/DL (ref 6.4–8.2)
PROT UR STRIP-MCNC: NEGATIVE MG/DL
Q-T INTERVAL, ECG07: 344 MS
QRS DURATION, ECG06: 92 MS
QTC CALCULATION (BEZET), ECG08: 441 MS
RBC # BLD AUTO: 3.87 M/UL (ref 4.7–5.5)
SODIUM SERPL-SCNC: 139 MMOL/L (ref 136–145)
SP GR UR REFRACTOMETRY: <1.005 (ref 1–1.03)
UROBILINOGEN UR QL STRIP.AUTO: 1 EU/DL (ref 0.2–1)
VENTRICULAR RATE, ECG03: 99 BPM
WBC # BLD AUTO: 10.6 K/UL (ref 4.6–13.2)

## 2019-05-11 PROCEDURE — 96361 HYDRATE IV INFUSION ADD-ON: CPT

## 2019-05-11 PROCEDURE — 80053 COMPREHEN METABOLIC PANEL: CPT

## 2019-05-11 PROCEDURE — 83605 ASSAY OF LACTIC ACID: CPT

## 2019-05-11 PROCEDURE — 74011250636 HC RX REV CODE- 250/636: Performed by: EMERGENCY MEDICINE

## 2019-05-11 PROCEDURE — 93005 ELECTROCARDIOGRAM TRACING: CPT

## 2019-05-11 PROCEDURE — 71045 X-RAY EXAM CHEST 1 VIEW: CPT

## 2019-05-11 PROCEDURE — 87077 CULTURE AEROBIC IDENTIFY: CPT

## 2019-05-11 PROCEDURE — 99285 EMERGENCY DEPT VISIT HI MDM: CPT

## 2019-05-11 PROCEDURE — 87040 BLOOD CULTURE FOR BACTERIA: CPT

## 2019-05-11 PROCEDURE — 81003 URINALYSIS AUTO W/O SCOPE: CPT

## 2019-05-11 PROCEDURE — 96360 HYDRATION IV INFUSION INIT: CPT

## 2019-05-11 PROCEDURE — 87186 SC STD MICRODIL/AGAR DIL: CPT

## 2019-05-11 PROCEDURE — 85025 COMPLETE CBC W/AUTO DIFF WBC: CPT

## 2019-05-11 RX ORDER — SODIUM CHLORIDE 0.9 % (FLUSH) 0.9 %
5-10 SYRINGE (ML) INJECTION AS NEEDED
Status: DISCONTINUED | OUTPATIENT
Start: 2019-05-11 | End: 2019-05-11 | Stop reason: HOSPADM

## 2019-05-11 RX ADMIN — SODIUM CHLORIDE 1000 ML: 900 INJECTION, SOLUTION INTRAVENOUS at 09:52
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
## 2019-05-11 NOTE — DISCHARGE INSTRUCTIONS

## 2019-05-12 ENCOUNTER — APPOINTMENT (OUTPATIENT)
Dept: GENERAL RADIOLOGY | Age: 49
DRG: 139 | End: 2019-05-12
Attending: EMERGENCY MEDICINE
Payer: MEDICAID

## 2019-05-12 ENCOUNTER — HOSPITAL ENCOUNTER (INPATIENT)
Age: 49
LOS: 5 days | Discharge: HOME | DRG: 139 | End: 2019-05-17
Attending: EMERGENCY MEDICINE | Admitting: INTERNAL MEDICINE
Payer: MEDICAID

## 2019-05-12 DIAGNOSIS — R50.9 FEBRILE ILLNESS: Primary | ICD-10-CM

## 2019-05-12 DIAGNOSIS — J18.9 PNEUMONIA DUE TO INFECTIOUS ORGANISM, UNSPECIFIED LATERALITY, UNSPECIFIED PART OF LUNG: ICD-10-CM

## 2019-05-12 DIAGNOSIS — R78.81 POSITIVE BLOOD CULTURE: ICD-10-CM

## 2019-05-12 DIAGNOSIS — S22.43XA CLOSED FRACTURE OF MULTIPLE RIBS OF BOTH SIDES, INITIAL ENCOUNTER: ICD-10-CM

## 2019-05-12 LAB
ALBUMIN SERPL-MCNC: 2.7 G/DL (ref 3.4–5)
ALBUMIN/GLOB SERPL: 0.9 {RATIO} (ref 0.8–1.7)
ALP SERPL-CCNC: 74 U/L (ref 45–117)
ALT SERPL-CCNC: 41 U/L (ref 16–61)
ANION GAP SERPL CALC-SCNC: 4 MMOL/L (ref 3–18)
APPEARANCE UR: CLEAR
AST SERPL-CCNC: 35 U/L (ref 15–37)
BACTERIA URNS QL MICRO: ABNORMAL /HPF
BASOPHILS # BLD: 0 K/UL (ref 0–0.06)
BASOPHILS NFR BLD: 0 % (ref 0–3)
BILIRUB SERPL-MCNC: 0.7 MG/DL (ref 0.2–1)
BILIRUB UR QL: ABNORMAL
BUN SERPL-MCNC: 11 MG/DL (ref 7–18)
BUN/CREAT SERPL: 12 (ref 12–20)
CALCIUM SERPL-MCNC: 8.4 MG/DL (ref 8.5–10.1)
CHLORIDE SERPL-SCNC: 107 MMOL/L (ref 100–108)
CO2 SERPL-SCNC: 28 MMOL/L (ref 21–32)
COLOR UR: ABNORMAL
CREAT SERPL-MCNC: 0.94 MG/DL (ref 0.6–1.3)
DIFFERENTIAL METHOD BLD: ABNORMAL
EOSINOPHIL # BLD: 0 K/UL (ref 0–0.4)
EOSINOPHIL NFR BLD: 0 % (ref 0–5)
EPITH CASTS URNS QL MICRO: ABNORMAL /LPF (ref 0–5)
ERYTHROCYTE [DISTWIDTH] IN BLOOD BY AUTOMATED COUNT: 13.7 % (ref 11.6–14.5)
FLUAV AG NPH QL IA: NEGATIVE
FLUBV AG NOSE QL IA: NEGATIVE
GLOBULIN SER CALC-MCNC: 3.1 G/DL (ref 2–4)
GLUCOSE SERPL-MCNC: 99 MG/DL (ref 74–99)
GLUCOSE UR STRIP.AUTO-MCNC: 100 MG/DL
HCT VFR BLD AUTO: 38.9 % (ref 36–48)
HGB BLD-MCNC: 13.9 G/DL (ref 13–16)
HGB UR QL STRIP: NEGATIVE
KETONES UR QL STRIP.AUTO: NEGATIVE MG/DL
LACTATE BLD-SCNC: 1.76 MMOL/L (ref 0.4–2)
LEUKOCYTE ESTERASE UR QL STRIP.AUTO: NEGATIVE
LIPASE SERPL-CCNC: 145 U/L (ref 73–393)
LYMPHOCYTES # BLD: 0.5 K/UL (ref 0.8–3.5)
LYMPHOCYTES NFR BLD: 5 % (ref 20–51)
MCH RBC QN AUTO: 33.6 PG (ref 24–34)
MCHC RBC AUTO-ENTMCNC: 35.7 G/DL (ref 31–37)
MCV RBC AUTO: 94 FL (ref 74–97)
MONOCYTES # BLD: 0.3 K/UL (ref 0–1)
MONOCYTES NFR BLD: 3 % (ref 2–9)
NEUTS BAND NFR BLD MANUAL: 2 % (ref 0–5)
NEUTS SEG # BLD: 9.1 K/UL (ref 1.8–8)
NEUTS SEG NFR BLD: 90 % (ref 42–75)
NITRITE UR QL STRIP.AUTO: NEGATIVE
PH UR STRIP: 6 [PH] (ref 5–8)
PLATELET # BLD AUTO: 104 K/UL (ref 135–420)
PLATELET COMMENTS,PCOM: ABNORMAL
PMV BLD AUTO: 10 FL (ref 9.2–11.8)
POTASSIUM SERPL-SCNC: 3.9 MMOL/L (ref 3.5–5.5)
PROT SERPL-MCNC: 5.8 G/DL (ref 6.4–8.2)
PROT UR STRIP-MCNC: 30 MG/DL
RBC # BLD AUTO: 4.14 M/UL (ref 4.7–5.5)
RBC #/AREA URNS HPF: ABNORMAL /HPF (ref 0–5)
RBC MORPH BLD: ABNORMAL
SODIUM SERPL-SCNC: 139 MMOL/L (ref 136–145)
SP GR UR REFRACTOMETRY: 1.03 (ref 1–1.03)
UROBILINOGEN UR QL STRIP.AUTO: 2 EU/DL (ref 0.2–1)
WBC # BLD AUTO: 9.9 K/UL (ref 4.6–13.2)
WBC URNS QL MICRO: ABNORMAL /HPF (ref 0–4)

## 2019-05-12 PROCEDURE — 74011000250 HC RX REV CODE- 250: Performed by: EMERGENCY MEDICINE

## 2019-05-12 PROCEDURE — 84145 PROCALCITONIN (PCT): CPT

## 2019-05-12 PROCEDURE — 99285 EMERGENCY DEPT VISIT HI MDM: CPT

## 2019-05-12 PROCEDURE — 74011250636 HC RX REV CODE- 250/636: Performed by: INTERNAL MEDICINE

## 2019-05-12 PROCEDURE — 87804 INFLUENZA ASSAY W/OPTIC: CPT

## 2019-05-12 PROCEDURE — 74011250637 HC RX REV CODE- 250/637: Performed by: EMERGENCY MEDICINE

## 2019-05-12 PROCEDURE — 80053 COMPREHEN METABOLIC PANEL: CPT

## 2019-05-12 PROCEDURE — 65270000029 HC RM PRIVATE

## 2019-05-12 PROCEDURE — 87040 BLOOD CULTURE FOR BACTERIA: CPT

## 2019-05-12 PROCEDURE — 87077 CULTURE AEROBIC IDENTIFY: CPT

## 2019-05-12 PROCEDURE — 83605 ASSAY OF LACTIC ACID: CPT

## 2019-05-12 PROCEDURE — 74011250636 HC RX REV CODE- 250/636: Performed by: EMERGENCY MEDICINE

## 2019-05-12 PROCEDURE — 74011250637 HC RX REV CODE- 250/637: Performed by: INTERNAL MEDICINE

## 2019-05-12 PROCEDURE — 87186 SC STD MICRODIL/AGAR DIL: CPT

## 2019-05-12 PROCEDURE — 83690 ASSAY OF LIPASE: CPT

## 2019-05-12 PROCEDURE — 85025 COMPLETE CBC W/AUTO DIFF WBC: CPT

## 2019-05-12 PROCEDURE — 71046 X-RAY EXAM CHEST 2 VIEWS: CPT

## 2019-05-12 PROCEDURE — 81001 URINALYSIS AUTO W/SCOPE: CPT

## 2019-05-12 RX ORDER — LEVOFLOXACIN 5 MG/ML
500 INJECTION, SOLUTION INTRAVENOUS EVERY 24 HOURS
Status: DISCONTINUED | OUTPATIENT
Start: 2019-05-12 | End: 2019-05-12 | Stop reason: SDUPTHER

## 2019-05-12 RX ORDER — BENZTROPINE MESYLATE 1 MG/1
1 TABLET ORAL EVERY EVENING
Status: DISCONTINUED | OUTPATIENT
Start: 2019-05-12 | End: 2019-05-17 | Stop reason: HOSPADM

## 2019-05-12 RX ORDER — LITHIUM CARBONATE 300 MG/1
600 CAPSULE ORAL EVERY EVENING
Status: DISCONTINUED | OUTPATIENT
Start: 2019-05-12 | End: 2019-05-13

## 2019-05-12 RX ORDER — SODIUM CHLORIDE 9 MG/ML
100 INJECTION, SOLUTION INTRAVENOUS CONTINUOUS
Status: DISCONTINUED | OUTPATIENT
Start: 2019-05-12 | End: 2019-05-17 | Stop reason: HOSPADM

## 2019-05-12 RX ORDER — CEFTRIAXONE 250 MG/8ML
1000 INJECTION, POWDER, FOR SOLUTION INTRAMUSCULAR; INTRAVENOUS ONCE
Status: DISCONTINUED | OUTPATIENT
Start: 2019-05-12 | End: 2019-05-12

## 2019-05-12 RX ORDER — LEVOFLOXACIN 5 MG/ML
500 INJECTION, SOLUTION INTRAVENOUS EVERY 24 HOURS
Status: DISCONTINUED | OUTPATIENT
Start: 2019-05-12 | End: 2019-05-14

## 2019-05-12 RX ORDER — ACETAMINOPHEN 500 MG
1000 TABLET ORAL
Status: COMPLETED | OUTPATIENT
Start: 2019-05-12 | End: 2019-05-12

## 2019-05-12 RX ORDER — VANCOMYCIN/0.9 % SOD CHLORIDE 1 G/100 ML
1000 PLASTIC BAG, INJECTION (ML) INTRAVENOUS
Status: COMPLETED | OUTPATIENT
Start: 2019-05-12 | End: 2019-05-12

## 2019-05-12 RX ORDER — OLANZAPINE 10 MG/1
10 TABLET ORAL
Status: DISCONTINUED | OUTPATIENT
Start: 2019-05-12 | End: 2019-05-12

## 2019-05-12 RX ORDER — ACETAMINOPHEN 325 MG/1
650 TABLET ORAL
Status: DISCONTINUED | OUTPATIENT
Start: 2019-05-12 | End: 2019-05-17 | Stop reason: HOSPADM

## 2019-05-12 RX ORDER — OLANZAPINE 10 MG/1
10 TABLET ORAL
Status: DISCONTINUED | OUTPATIENT
Start: 2019-05-12 | End: 2019-05-17 | Stop reason: HOSPADM

## 2019-05-12 RX ORDER — HYDROCODONE BITARTRATE AND ACETAMINOPHEN 5; 325 MG/1; MG/1
1 TABLET ORAL
Status: DISCONTINUED | OUTPATIENT
Start: 2019-05-12 | End: 2019-05-13

## 2019-05-12 RX ORDER — HALOPERIDOL 10 MG/1
20 TABLET ORAL
Status: DISCONTINUED | OUTPATIENT
Start: 2019-05-12 | End: 2019-05-17 | Stop reason: HOSPADM

## 2019-05-12 RX ORDER — HYDROXYZINE 25 MG/1
100 TABLET, FILM COATED ORAL EVERY EVENING
Status: DISCONTINUED | OUTPATIENT
Start: 2019-05-12 | End: 2019-05-17 | Stop reason: HOSPADM

## 2019-05-12 RX ORDER — CALCIUM POLYCARBOPHIL 625 MG
1 TABLET ORAL 2 TIMES DAILY
Status: DISCONTINUED | OUTPATIENT
Start: 2019-05-12 | End: 2019-05-17 | Stop reason: HOSPADM

## 2019-05-12 RX ORDER — HEPARIN SODIUM 5000 [USP'U]/ML
5000 INJECTION, SOLUTION INTRAVENOUS; SUBCUTANEOUS EVERY 8 HOURS
Status: DISCONTINUED | OUTPATIENT
Start: 2019-05-12 | End: 2019-05-13

## 2019-05-12 RX ORDER — RANITIDINE 150 MG/1
150 TABLET, FILM COATED ORAL 2 TIMES DAILY
Status: DISCONTINUED | OUTPATIENT
Start: 2019-05-12 | End: 2019-05-14 | Stop reason: ALTCHOICE

## 2019-05-12 RX ADMIN — ACETAMINOPHEN 1000 MG: 500 TABLET ORAL at 14:24

## 2019-05-12 RX ADMIN — HYDROCODONE BITARTRATE AND ACETAMINOPHEN 1 TABLET: 5; 325 TABLET ORAL at 20:33

## 2019-05-12 RX ADMIN — LEVOFLOXACIN 500 MG: 5 INJECTION, SOLUTION INTRAVENOUS at 20:40

## 2019-05-12 RX ADMIN — SODIUM CHLORIDE 100 ML/HR: 900 INJECTION, SOLUTION INTRAVENOUS at 20:38

## 2019-05-12 RX ADMIN — WATER 1 G: 1 INJECTION INTRAMUSCULAR; INTRAVENOUS; SUBCUTANEOUS at 16:22

## 2019-05-12 RX ADMIN — BENZTROPINE MESYLATE 1 MG: 1 TABLET ORAL at 20:34

## 2019-05-12 RX ADMIN — VANCOMYCIN HYDROCHLORIDE 1000 MG: 10 INJECTION, POWDER, LYOPHILIZED, FOR SOLUTION INTRAVENOUS at 16:16
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
## 2019-05-12 NOTE — ROUTINE PROCESS
TRANSFER - OUT REPORT:    Verbal report given to RN(name) on Kenya De La O  being transferred to (unit) for routine progression of care       Report consisted of patients Situation, Background, Assessment and   Recommendations(SBAR). Information from the following report(s) SBAR was reviewed with the receiving nurse. Lines:   Peripheral IV 05/12/19 Right Forearm (Active)   Site Assessment Clean, dry, & intact 5/12/2019  1:49 PM   Phlebitis Assessment 0 5/12/2019  1:49 PM   Infiltration Assessment 0 5/12/2019  1:49 PM   Dressing Status Clean, dry, & intact 5/12/2019  1:49 PM        Opportunity for questions and clarification was provided.       Patient transported with:   Streyner

## 2019-05-12 NOTE — ED NOTES
Patient return call to the ED. He was informed of positive blood cultures and possible sepsis. He states he will come back to the ED for admission and antibiotics.
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
## 2019-05-13 LAB
GLUCOSE BLD STRIP.AUTO-MCNC: 102 MG/DL (ref 70–110)
PROCALCITONIN SERPL-MCNC: 0.9 NG/ML

## 2019-05-13 PROCEDURE — 74011250637 HC RX REV CODE- 250/637: Performed by: INTERNAL MEDICINE

## 2019-05-13 PROCEDURE — 74011250637 HC RX REV CODE- 250/637: Performed by: NURSE PRACTITIONER

## 2019-05-13 PROCEDURE — 74011250636 HC RX REV CODE- 250/636: Performed by: INTERNAL MEDICINE

## 2019-05-13 PROCEDURE — 74011000250 HC RX REV CODE- 250: Performed by: INTERNAL MEDICINE

## 2019-05-13 PROCEDURE — 65270000029 HC RM PRIVATE

## 2019-05-13 PROCEDURE — 82962 GLUCOSE BLOOD TEST: CPT

## 2019-05-13 RX ORDER — HYDROCODONE BITARTRATE AND ACETAMINOPHEN 5; 325 MG/1; MG/1
1 TABLET ORAL
Status: DISCONTINUED | OUTPATIENT
Start: 2019-05-13 | End: 2019-05-13

## 2019-05-13 RX ORDER — VANCOMYCIN HYDROCHLORIDE
1250 EVERY 12 HOURS
Status: DISCONTINUED | OUTPATIENT
Start: 2019-05-13 | End: 2019-05-14

## 2019-05-13 RX ORDER — LITHIUM CARBONATE 300 MG/1
300 CAPSULE ORAL EVERY EVENING
Status: DISCONTINUED | OUTPATIENT
Start: 2019-05-14 | End: 2019-05-17 | Stop reason: HOSPADM

## 2019-05-13 RX ORDER — HYDROCODONE BITARTRATE AND ACETAMINOPHEN 10; 325 MG/1; MG/1
1 TABLET ORAL
Status: DISCONTINUED | OUTPATIENT
Start: 2019-05-13 | End: 2019-05-17 | Stop reason: HOSPADM

## 2019-05-13 RX ADMIN — HYDROCODONE BITARTRATE AND ACETAMINOPHEN 1 TABLET: 10; 325 TABLET ORAL at 13:18

## 2019-05-13 RX ADMIN — HYDROCODONE BITARTRATE AND ACETAMINOPHEN 1 TABLET: 5; 325 TABLET ORAL at 04:17

## 2019-05-13 RX ADMIN — ACETAMINOPHEN 650 MG: 325 TABLET ORAL at 09:15

## 2019-05-13 RX ADMIN — ACETAMINOPHEN 325 MG: 325 TABLET ORAL at 00:49

## 2019-05-13 RX ADMIN — HYDROCODONE BITARTRATE AND ACETAMINOPHEN 1 TABLET: 10; 325 TABLET ORAL at 22:52

## 2019-05-13 RX ADMIN — HYDROCODONE BITARTRATE AND ACETAMINOPHEN 1 TABLET: 5; 325 TABLET ORAL at 00:49

## 2019-05-13 RX ADMIN — HYDROCODONE BITARTRATE AND ACETAMINOPHEN 1 TABLET: 10; 325 TABLET ORAL at 17:05

## 2019-05-13 RX ADMIN — LITHIUM CARBONATE 600 MG: 300 CAPSULE ORAL at 18:00

## 2019-05-13 RX ADMIN — LEVOFLOXACIN 500 MG: 5 INJECTION, SOLUTION INTRAVENOUS at 22:54

## 2019-05-13 RX ADMIN — CEFTRIAXONE SODIUM 1 G: 1 INJECTION, POWDER, FOR SOLUTION INTRAMUSCULAR; INTRAVENOUS at 16:37

## 2019-05-13 RX ADMIN — HYDROCODONE BITARTRATE AND ACETAMINOPHEN 1 TABLET: 5; 325 TABLET ORAL at 08:21

## 2019-05-13 RX ADMIN — Medication 150 MG: at 18:00

## 2019-05-13 RX ADMIN — ACETAMINOPHEN 650 MG: 325 TABLET ORAL at 16:52

## 2019-05-13 NOTE — PROGRESS NOTES
conducted an initial consultation and Spiritual Assessment for Galdino Singh, who is a 47 y.o.,male. Patients Primary Language is: [place]. According to the patients EMR Christian Affiliation is: [place]. The reason the Patient came to the hospital is:   Patient Active Problem List    Diagnosis Date Noted    Febrile illness 05/12/2019    CAP (community acquired pneumonia) 05/12/2019    Schizophrenia ([address] 05/07/2018    H/O bipolar disorder 05/07/2018    Tobacco user 05/07/2018        The  provided the following Interventions:  Initiated a relationship of care and support. Explored issues of jasmina, belief, spirituality and Buddhism/ritual needs while hospitalized. Listened empathically. Provided chaplaincy education. Provided information about Spiritual Care Services. Offered prayer and assurance of continued prayers on patient's behalf. Chart reviewed. The following outcomes where achieved:  Patient shared limited information about both their medical narrative and spiritual journey/beliefs. Patient processed feeling about current hospitalization. Patient expressed gratitude for 's visit. Assessment:  Patient does not have any Buddhism/cultural needs that will affect patients preferences in health care. There are no spiritual or Buddhism issues which require intervention at this time. Plan:  Chaplains will continue to follow and will provide pastoral care on an as needed/requested basis.  recommends bedside caregivers page  on duty if patient shows signs of acute spiritual or emotional distress.     [id number] [facility]   [phone]

## 2019-05-13 NOTE — ROUTINE PROCESS
TRANSFER - IN REPORT:    Verbal report received from SHENG Romero(name) on [facility]  being received from ER(unit) for routine progression of care      Report consisted of patients Situation, Background, Assessment and   Recommendations(SBAR). Information from the following report(s) SBAR, Kardex, ED Summary, Intake/Output, MAR, Recent Results and Cardiac Rhythm SR was reviewed with the receiving nurse. Opportunity for questions and clarification was provided. Assessment completed upon patients arrival to unit and care assumed. Primary Nurse Claribel Garrido RN and SHENG Mix performed a dual skin assessment on this patient No impairment noted  Yousif score is 21    Bedside and Verbal shift change report given to SHENG Cowan (oncoming nurse) by me (offgoing nurse).  Report included the following information SBAR, Kardex, ED Summary, Intake/Output, MAR, Recent Results and Cardiac Rhythm SR.

## 2019-05-13 NOTE — PROGRESS NOTES
DISCHARGE PLANNING  Reason for Admission:   Firman Glory ILLNESS                   RRAT Score:  4                   Plan for utilizing home health:    NOT ORDERED                      Current Advanced Directive/Advance Care Plan: NOT ON FILE    Likelihood of Readmission:  LOW                         Transition of Care Plan:       [address] Management Interventions  PCP Verified by CM: Yes  Palliative Care Criteria Met (RRAT>21 & CHF Dx)?: No  Mode of Transport at Discharge:  Other (see comment)(family will transport this pt home)  Transition of Care Consult (CM Consult): Discharge Planning  Current Support Network: Own Home, Lives Alone, Family Lives Nearby  Confirm Follow Up Transport: Self  Plan discussed with Pt/Family/Caregiver: Yes(will return home with family and friend support)  Discharge Location  Discharge Placement: Home with family assistance     The plan for this pt is for discharge on 5/14/19 to home      [address]
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
## 2019-05-13 NOTE — PROGRESS NOTES
MEWS score of 3 d/t fever. MD notified-no order. Pt's on antibiotics-see MAR. Tylenol given. Pt on IVF going per order.  Will monitor temp.     05/13/19 0042   Vital Signs   Temp (!) 102.6 °F (39.2 °C)  (nurse India Toribio made aware of vitals at this time )   Temp Source Oral   Pulse (Heart Rate) 88   Heart Rate Source Monitor   Resp Rate 18   O2 Sat (%) 96 %   Level of Consciousness Alert   /61   MAP (Calculated) 79   BP 1 Method Automatic   BP 1 Location Left arm   BP Patient Position At rest   MEWS Score 3

## 2019-05-14 ENCOUNTER — APPOINTMENT (OUTPATIENT)
Dept: CT IMAGING | Age: 49
DRG: 139 | End: 2019-05-14
Attending: NURSE PRACTITIONER
Payer: MEDICAID

## 2019-05-14 LAB
ANION GAP SERPL CALC-SCNC: 7 MMOL/L (ref 3–18)
BACTERIA SPEC CULT: ABNORMAL
BASOPHILS # BLD: 0 K/UL (ref 0–0.1)
BASOPHILS NFR BLD: 0 % (ref 0–2)
BUN SERPL-MCNC: 9 MG/DL (ref 7–18)
BUN/CREAT SERPL: 14 (ref 12–20)
CALCIUM SERPL-MCNC: 8 MG/DL (ref 8.5–10.1)
CHLORIDE SERPL-SCNC: 106 MMOL/L (ref 100–108)
CO2 SERPL-SCNC: 23 MMOL/L (ref 21–32)
CREAT SERPL-MCNC: 0.64 MG/DL (ref 0.6–1.3)
DIFFERENTIAL METHOD BLD: ABNORMAL
EOSINOPHIL # BLD: 0.1 K/UL (ref 0–0.4)
EOSINOPHIL NFR BLD: 1 % (ref 0–5)
ERYTHROCYTE [DISTWIDTH] IN BLOOD BY AUTOMATED COUNT: 13.1 % (ref 11.6–14.5)
GLUCOSE SERPL-MCNC: 92 MG/DL (ref 74–99)
GRAM STN SPEC: ABNORMAL
HCT VFR BLD AUTO: 33.7 % (ref 36–48)
HGB BLD-MCNC: 11.8 G/DL (ref 13–16)
LYMPHOCYTES # BLD: 1.1 K/UL (ref 0.9–3.6)
LYMPHOCYTES NFR BLD: 15 % (ref 21–52)
MCH RBC QN AUTO: 32.3 PG (ref 24–34)
MCHC RBC AUTO-ENTMCNC: 35 G/DL (ref 31–37)
MCV RBC AUTO: 92.3 FL (ref 74–97)
MONOCYTES # BLD: 0.9 K/UL (ref 0.05–1.2)
MONOCYTES NFR BLD: 13 % (ref 3–10)
NEUTS SEG # BLD: 5.3 K/UL (ref 1.8–8)
NEUTS SEG NFR BLD: 71 % (ref 40–73)
PLATELET # BLD AUTO: 106 K/UL (ref 135–420)
PMV BLD AUTO: 10.4 FL (ref 9.2–11.8)
POTASSIUM SERPL-SCNC: 3.7 MMOL/L (ref 3.5–5.5)
RBC # BLD AUTO: 3.65 M/UL (ref 4.7–5.5)
SERVICE CMNT-IMP: ABNORMAL
SERVICE CMNT-IMP: ABNORMAL
SODIUM SERPL-SCNC: 136 MMOL/L (ref 136–145)
WBC # BLD AUTO: 7.4 K/UL (ref 4.6–13.2)

## 2019-05-14 PROCEDURE — 74011250636 HC RX REV CODE- 250/636: Performed by: NURSE PRACTITIONER

## 2019-05-14 PROCEDURE — 85025 COMPLETE CBC W/AUTO DIFF WBC: CPT

## 2019-05-14 PROCEDURE — 74011000258 HC RX REV CODE- 258: Performed by: INTERNAL MEDICINE

## 2019-05-14 PROCEDURE — 77030027138 HC INCENT SPIROMETER -A

## 2019-05-14 PROCEDURE — 74011250636 HC RX REV CODE- 250/636: Performed by: INTERNAL MEDICINE

## 2019-05-14 PROCEDURE — 74011250637 HC RX REV CODE- 250/637: Performed by: NURSE PRACTITIONER

## 2019-05-14 PROCEDURE — 80048 BASIC METABOLIC PNL TOTAL CA: CPT

## 2019-05-14 PROCEDURE — 71275 CT ANGIOGRAPHY CHEST: CPT

## 2019-05-14 PROCEDURE — 65270000029 HC RM PRIVATE

## 2019-05-14 PROCEDURE — 36415 COLL VENOUS BLD VENIPUNCTURE: CPT

## 2019-05-14 PROCEDURE — 74011636320 HC RX REV CODE- 636/320: Performed by: FAMILY MEDICINE

## 2019-05-14 RX ORDER — FAMOTIDINE 20 MG/1
20 TABLET, FILM COATED ORAL 2 TIMES DAILY
Status: DISCONTINUED | OUTPATIENT
Start: 2019-05-14 | End: 2019-05-17 | Stop reason: HOSPADM

## 2019-05-14 RX ADMIN — HYDROCODONE BITARTRATE AND ACETAMINOPHEN 1 TABLET: 10; 325 TABLET ORAL at 17:12

## 2019-05-14 RX ADMIN — HYDROCODONE BITARTRATE AND ACETAMINOPHEN 1 TABLET: 10; 325 TABLET ORAL at 12:52

## 2019-05-14 RX ADMIN — CEFAZOLIN 1 G: 1 INJECTION, POWDER, FOR SOLUTION INTRAMUSCULAR; INTRAVENOUS at 22:44

## 2019-05-14 RX ADMIN — CEFAZOLIN 1 G: 1 INJECTION, POWDER, FOR SOLUTION INTRAMUSCULAR; INTRAVENOUS at 17:12

## 2019-05-14 RX ADMIN — HYDROCODONE BITARTRATE AND ACETAMINOPHEN 1 TABLET: 10; 325 TABLET ORAL at 21:50

## 2019-05-14 RX ADMIN — HYDROCODONE BITARTRATE AND ACETAMINOPHEN 1 TABLET: 10; 325 TABLET ORAL at 08:17

## 2019-05-14 RX ADMIN — VANCOMYCIN HYDROCHLORIDE 1250 MG: 10 INJECTION, POWDER, LYOPHILIZED, FOR SOLUTION INTRAVENOUS at 03:36

## 2019-05-14 RX ADMIN — IOPAMIDOL 80 ML: 755 INJECTION, SOLUTION INTRAVENOUS at 16:12

## 2019-05-14 RX ADMIN — SODIUM CHLORIDE 100 ML/HR: 900 INJECTION, SOLUTION INTRAVENOUS at 12:53

## 2019-05-14 RX ADMIN — HYDROCODONE BITARTRATE AND ACETAMINOPHEN 1 TABLET: 10; 325 TABLET ORAL at 03:39
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
## 2019-05-14 NOTE — PROGRESS NOTES
Received on unit from ED. Placed in 473. Vital signs stable. resp set up Bi-pap and placed on pt. resp 22-24. 02sats=96%. Tele#[id number] without ectopics.

## 2019-05-14 NOTE — PROGRESS NOTES
Famotidine was therapeutically interchanged for Zantac per the P &T Committee approved Therapeutic Interchanges Policy.

## 2019-05-14 NOTE — ROUTINE PROCESS
Bedside and Verbal shift change report given to RAD RN (oncoming nurse) by Sena Nix RN (offgoing nurse). Report included the following information SBAR, Kardex and MAR.

## 2019-05-14 NOTE — PROGRESS NOTES
Discharge Planning: This writer called Emerson Corley -Phone [phone]-, per request of this pt's . This payor's voice mail was full. This writer will attempt again.     This pt placed on his clothes and left the hospital floor, pt was bought back by hospital security  Pt at this time is agreeing to stay    [facility]
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
## 2019-05-14 NOTE — PROGRESS NOTES
Patient refused TELE and SCD's. Also threatening topull out IV. Discussed with patient the importance of all treatments and the need for his IVV/ABT therapy. Continuing to  Encourage patient to be compliant with care with little effect.

## 2019-05-15 LAB
BACTERIA SPEC CULT: ABNORMAL
BASOPHILS # BLD: 0 K/UL (ref 0–0.1)
BASOPHILS NFR BLD: 0 % (ref 0–2)
DIFFERENTIAL METHOD BLD: ABNORMAL
EOSINOPHIL # BLD: 0.1 K/UL (ref 0–0.4)
EOSINOPHIL NFR BLD: 2 % (ref 0–5)
ERYTHROCYTE [DISTWIDTH] IN BLOOD BY AUTOMATED COUNT: 13 % (ref 11.6–14.5)
GRAM STN SPEC: ABNORMAL
HCT VFR BLD AUTO: 34.7 % (ref 36–48)
HGB BLD-MCNC: 12.2 G/DL (ref 13–16)
LYMPHOCYTES # BLD: 1.1 K/UL (ref 0.9–3.6)
LYMPHOCYTES NFR BLD: 21 % (ref 21–52)
MCH RBC QN AUTO: 32.2 PG (ref 24–34)
MCHC RBC AUTO-ENTMCNC: 35.2 G/DL (ref 31–37)
MCV RBC AUTO: 91.6 FL (ref 74–97)
MONOCYTES # BLD: 1 K/UL (ref 0.05–1.2)
MONOCYTES NFR BLD: 19 % (ref 3–10)
NEUTS SEG # BLD: 3.1 K/UL (ref 1.8–8)
NEUTS SEG NFR BLD: 58 % (ref 40–73)
PLATELET # BLD AUTO: 109 K/UL (ref 135–420)
PMV BLD AUTO: 9.4 FL (ref 9.2–11.8)
RBC # BLD AUTO: 3.79 M/UL (ref 4.7–5.5)
SERVICE CMNT-IMP: ABNORMAL
SERVICE CMNT-IMP: ABNORMAL
WBC # BLD AUTO: 5.4 K/UL (ref 4.6–13.2)

## 2019-05-15 PROCEDURE — 74011250636 HC RX REV CODE- 250/636: Performed by: INTERNAL MEDICINE

## 2019-05-15 PROCEDURE — 36415 COLL VENOUS BLD VENIPUNCTURE: CPT

## 2019-05-15 PROCEDURE — 87040 BLOOD CULTURE FOR BACTERIA: CPT

## 2019-05-15 PROCEDURE — 85025 COMPLETE CBC W/AUTO DIFF WBC: CPT

## 2019-05-15 PROCEDURE — 65270000029 HC RM PRIVATE

## 2019-05-15 PROCEDURE — 74011250637 HC RX REV CODE- 250/637: Performed by: NURSE PRACTITIONER

## 2019-05-15 PROCEDURE — 74011000258 HC RX REV CODE- 258: Performed by: INTERNAL MEDICINE

## 2019-05-15 RX ADMIN — HYDROCODONE BITARTRATE AND ACETAMINOPHEN 1 TABLET: 10; 325 TABLET ORAL at 15:49

## 2019-05-15 RX ADMIN — SODIUM CHLORIDE 100 ML/HR: 900 INJECTION, SOLUTION INTRAVENOUS at 04:14

## 2019-05-15 RX ADMIN — CEFAZOLIN 1 G: 1 INJECTION, POWDER, FOR SOLUTION INTRAMUSCULAR; INTRAVENOUS at 22:10

## 2019-05-15 RX ADMIN — CEFAZOLIN 1 G: 1 INJECTION, POWDER, FOR SOLUTION INTRAMUSCULAR; INTRAVENOUS at 06:35

## 2019-05-15 RX ADMIN — HYDROCODONE BITARTRATE AND ACETAMINOPHEN 1 TABLET: 10; 325 TABLET ORAL at 21:32

## 2019-05-15 RX ADMIN — SODIUM CHLORIDE 100 ML/HR: 900 INJECTION, SOLUTION INTRAVENOUS at 21:34

## 2019-05-15 RX ADMIN — HYDROCODONE BITARTRATE AND ACETAMINOPHEN 1 TABLET: 10; 325 TABLET ORAL at 08:45

## 2019-05-15 RX ADMIN — HYDROCODONE BITARTRATE AND ACETAMINOPHEN 1 TABLET: 10; 325 TABLET ORAL at 04:14
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
## 2019-05-15 NOTE — ROUTINE PROCESS
Bedside and Verbal shift change report given to Lukas Montes De Oca RN (oncoming nurse) by Lenin Cortez RN and Memo Orozco RN (offgoing nurse). Report included the following information SBAR, Kardex, Intake/Output, MAR and Recent Results.

## 2019-05-15 NOTE — ROUTINE PROCESS
Bedside and Verbal shift change report given to Stella Lewis RN and Lorne Leyva RN (oncoming nurse) by Brad Doty RN (offgoing nurse). Report included the following information SBAR, Kardex, Intake/Output, MAR and Recent Results.

## 2019-05-15 NOTE — PROGRESS NOTES
Nurse informed this writer that patient wanted to be discharged. He was refusing treatments. Refused reinsertion of IV, IV antibiotic therapy, blood work prescribed by ID. Refused to wait for follow up treatment and plan of care. Patient wants to leave AMA d/t personal issues that he needs to attend to. Patient states if he does not go to work today he will lose his job. Patient left the floor and apparently went to administration to discuss his concerns. The patient returned to his room. Nurse Manager Anna Jorgensen spoke with this writer in regards and being a patient advocate. Patient advocate in the room with patient. Nurse manager Anna Jorgensen is attempting to follow up with ID for recommendations for treatment. This writer spoke with ID for recommendations on treatment  Treatment plan  1. Blood cultures today  2. If patient still needs to leave may leave AMA. He is not medically cleared by hospitalist or ID until blood culture results return. 3. If patient leaves then advised to return to ED to follow up with blood cultures and have a definite plan of care to treat once results for blood culture. 4. ID recommendations to continue IV antibiotics until blood culture results. Patient decided to stay.  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

## 2019-05-15 NOTE — PROGRESS NOTES
Observed patient to be out of his room with street clothes missing from csoet and IV continuing to infuse on to the jessica. #20 peripheral IV not attached to IV tubing and pump. Ccntacted security, charge nurse, and Physician. Patient returned to unit by security. Was found outside smoking. Reminded patient of facility of being smoke free. Patient continues to refuse nicotine patch and gum. Continues to express wanting to be discharged.

## 2019-05-15 NOTE — PROGRESS NOTES
Infectious Disease progress Note    Requested by: dr. Son Liter    Reason: MSSA bacteremia, sepsis, pneumonia    Current abx Prior abx   Cefazolin since 5/14/19 Ceftriaxone 5/12  Levofloxacin, vancomycin  5/12/19-5/14/19     Lines:       Assessment :    47 y.o. male with past medical history of schizophrenia, bipolar disorder admitted to [address] [place] [address] on 5/12/19 due to positive blood cultures. Clinical picture consistent with sepsis (POA) due to MSSA bloodstream infection (positive blood cultures 5/11/19, 5/12/19). Pleuritic chest pain likely due to rib fracture as seen on ct scan 5/14. No evidence of septic pulmonary emboli    Source of bacteremia not entirely clear - ? Due to undiagnosed skin trauma. Patient is a plasma donor - donates every 2 weeks - last donated 5/9/19    Infiltrates on cxr: r/o septic pulmonary emboli. Low clinical probability of community acquired pneumonia. Clinically better. Insists that he wants to leave. Refuses blood culture, further work up. Recommendations:    1. Continue cefazolin  2.f/u  2D echo  3. Repeat blood cultures today      Advance Care planning: full code: discussed  with patient/surrogate decision maker:Ryder doll Lion: [phone]      Above plan was discussed in details with patient, primary team. Please call me if any further questions or concerns. Will continue to participate in the care of this patient. HPI:    Patient is anxious. States that he needs to leave the hospital to go back to work. He is on probation and will have to go to longterm if he is kept in hospital. Otherwise, he denies any neck pain, dysuria, skin rashes or lesions, shortness of breath, or other associated symptoms. He denies back pain.           home Medication List    Details   calcium polycarbophil (FIBER-LAX) 625 mg tablet Take 625 mg by mouth two (2) times a day. raNITIdine (ZANTAC) 150 mg tablet Take 150 mg by mouth daily.       !! OLANZapine (ZYPREXA) 10 mg tablet Take 1 Tab by mouth nightly. Qty: 90 Tab, Refills: 3    Associated Diagnoses: Schizophrenia, unspecified type ([facility] [address] H/O bipolar disorder; Medication refill      !! OLANZapine (ZYPREXA) 10 mg tablet Take 1 Tab by mouth nightly. Qty: 90 Tab, Refills: 0    Associated Diagnoses: Schizophrenia, unspecified type ([address] H/O bipolar disorder; Medication refill      lithium carbonate 300 mg capsule Take 2 Caps by mouth every evening. Qty: 60 Cap, Refills: 0    Associated Diagnoses: Schizophrenia, unspecified type ([address] H/O bipolar disorder; Medication refill      hydrOXYzine HCl (ATARAX) 50 mg tablet Take 2 Tabs by mouth every evening. Qty: 60 Tab, Refills: 0    Associated Diagnoses: Schizophrenia, unspecified type ([facility] [address] H/O bipolar disorder; Medication refill      haloperidol (HALDOL) 20 mg tablet Take 1 Tab by mouth nightly. Qty: 30 Tab, Refills: 0    Associated Diagnoses: Schizophrenia, unspecified type ([facility] [address] H/O bipolar disorder; Medication refill      benztropine (COGENTIN) 1 mg tablet Take 1 Tab by mouth every evening. Qty: 30 Tab, Refills: 0    Associated Diagnoses: Schizophrenia, unspecified type ([facility] [address] H/O bipolar disorder; Medication refill       !! - Potential duplicate medications found. Please discuss with provider.           Current Facility-Administered Medications   Medication Dose Route Frequency    ceFAZolin (ANCEF) 1 g in 0.9% sodium chloride (MBP/ADV) 50 mL MBP  1 g IntraVENous Q8H    famotidine (PEPCID) tablet 20 mg  20 mg Oral BID    HYDROcodone-acetaminophen (NORCO)  mg tablet 1 Tab  1 Tab Oral Q4H PRN    lithium carbonate capsule 300 mg  300 mg Oral QPM    calcium polycarbophil (FIBERCON) tablet 625 mg  1 Tab Oral BID    OLANZapine (ZyPREXA) tablet 10 mg  10 mg Oral QHS    hydrOXYzine HCl (ATARAX) tablet 100 mg  100 mg Oral QPM    haloperidol (HALDOL) tablet 20 mg  20 mg Oral QHS    benztropine (COGENTIN) tablet 1 mg  1 mg Oral QPM    0.9% sodium chloride infusion  100 mL/hr IntraVENous CONTINUOUS    acetaminophen (TYLENOL) tablet 650 mg  650 mg Oral Q4H PRN       Allergies: Patient has no known allergies. Temp (24hrs), Av.7 °F (37.1 °C), Min:98.2 °F (36.8 °C), Max:99.4 °F (37.4 °C)    Visit Vitals  /66 (BP 1 Location: Left arm, BP Patient Position: At rest)   Pulse 75   Temp 98.6 °F (37 °C)   Resp 18   Ht 6' 1\" (1.854 m)   Wt 69.8 kg (153 lb 14.1 oz)   SpO2 99%   BMI 20.30 kg/m²       ROS: 12 point ROS obtained in details. Pertinent positives as mentioned in HPI,   otherwise negative    Physical Exam:      Constituational: Patient is afebrile, vital signs reviewed, patient is uncomfortable in mild distress. Head: Atraumatic, normocephalic  Eyes: Normal inspection. No conjunctival injection or discharge. ENT:  No facial bruises, normal external ear and nose inspection. Neck:  Supple, non tender. normal inspection. No meningeal signs  Cardiovascular:  regular rate and rhythm, heart sounds normal without murmurs. Radial pulses 2+ and equal bilaterally  Respiratory:  No respiratory distress, breath sounds normal.  No rales or wheezing  Abdomen: soft, nontender, nondistended, no rebound or guarding, normoactive bowel sounds  Back: non-tender. No erythema or rash. No CVA tenderness. Musculoskeletal:  normal ROM, non-tender, no pedal edema. Calves soft, symmetric, and non-tender with no palpable cords. Skin: color normal, no rash, warm, dry . Neuro: awake & alert oriented ×3, no motor/sensory deficit. Upper extremity and lower extremity strength and sensation are grossly normal, intact, and symmetric. CN 3-12 normal, intact, and symmetric.   Psych: mood/affect normal.        Labs: Results:   Chemistry Recent Labs     19  0223 19  1319   GLU 92 99    139   K 3.7 3.9    107   CO2 23 28   BUN 9 11   CREA 0.64 0.94   CA 8.0* 8.4*   AGAP 7 4   BUCR 14 12   AP  --  74   TP  --  5.8*   ALB  --  2.7*   GLOB  --  3.1   AGRAT  --  0.9      CBC w/Diff Recent Labs 05/15/19  0310 05/14/19  0223 05/12/19  1319   WBC 5.4 7.4 9.9   RBC 3.79* 3.65* 4.14*   HGB 12.2* 11.8* 13.9   HCT 34.7* 33.7* 38.9   * 106* 104*   GRANS 58 71 90*   LYMPH 21 15* 5*   EOS 2 1 0      Microbiology Recent Labs     05/12/19  1330 05/12/19  1319   CULT ANAEROBIC BOTTLE STAPHYLOCOCCUS AUREUS*  For Susceptibility Refer to Culture  [id number]   AEROBIC AND ANAEROBIC BOTTLES STAPHYLOCOCCUS AUREUS*          RADIOLOGY:    All available imaging studies/reports in [facility] for this admission were reviewed    Dr. Nirali Chisholm, Infectious Disease Specialist  [phone]  May 15, 2019  1:02 PM

## 2019-05-15 NOTE — PROGRESS NOTES
Bedside and Verbal shift change report given to SHENG Del Real (oncoming nurse) by Ricky Jiménez (offgoing nurse). Report included the following information SBAR, Kardex, Procedure Summary, Intake/Output, MAR, Recent Results and Med Rec Status.

## 2019-05-15 NOTE — PROGRESS NOTES
Problem: Falls - Risk of  Goal: *Absence of Falls  Description  Document Samreen Getting Fall Risk and appropriate interventions in the flowsheet. Outcome: Progressing Towards Goal     Problem: Patient Education: Go to Patient Education Activity  Goal: Patient/Family Education  Outcome: Progressing Towards Goal     Problem: Patient Education: Go to Patient Education Activity  Goal: Patient/Family Education  Outcome: Progressing Towards Goal     Problem: Pneumonia: Day 1  Goal: Off Pathway (Use only if patient is Off Pathway)  Outcome: Progressing Towards Goal  Goal: Activity/Safety  Outcome: Progressing Towards Goal  Goal: Consults, if ordered  Outcome: Progressing Towards Goal  Goal: Diagnostic Test/Procedures  Outcome: Progressing Towards Goal  Goal: Nutrition/Diet  Outcome: Progressing Towards Goal  Goal: Medications  Outcome: Progressing Towards Goal  Goal: Respiratory  Outcome: Progressing Towards Goal  Goal: Treatments/Interventions/Procedures  Outcome: Progressing Towards Goal  Goal: Psychosocial  Outcome: Progressing Towards Goal  Goal: *Oxygen saturation within defined limits  Outcome: Progressing Towards Goal  Goal: *Influenza vaccine administered (October-March)  Outcome: Progressing Towards Goal  Goal: *Pneumoccocal vaccine administered  Outcome: Progressing Towards Goal  Goal: *Hemodynamically stable  Outcome: Progressing Towards Goal  Goal: *Demonstrates progressive activity  Outcome: Progressing Towards Goal  Goal: *Tolerating diet  Outcome: Progressing Towards Goal     Problem:  Body Temperature -  Risk of, Imbalanced  Goal: *Absence of heat stress or hyperthermia signs and symptoms  Outcome: Progressing Towards Goal     Problem: Patient Education: Go to Patient Education Activity  Goal: Patient/Family Education  Outcome: Progressing Towards Goal     Problem: Suicide/Homicide (Adult/Pediatric)  Goal: *STG: Remains safe in hospital  Outcome: Progressing Towards Goal  Goal: *STG: Seeks staff when feelings of self harm or harm towards others arise  Outcome: Progressing Towards Goal  Goal: *STG: Attends activities and groups  Outcome: Progressing Towards Goal  Goal: *STG:  Verbalizes alternative ways of dealing with maladaptive feelings/behaviors  Outcome: Progressing Towards Goal  Goal: *STG/LTG: Complies with medication therapy  Outcome: Progressing Towards Goal  Goal: *STG/LTG:  No longer expresses self destructive or suicidal/homicidal thoughts  Outcome: Progressing Towards Goal  Goal: *LTG:  Identifies available community resources  Outcome: Progressing Towards Goal  Goal: *LTG:  Develops proactive suicide prevention plan  Outcome: Progressing Towards Goal  Goal: Interventions  Outcome: Progressing Towards Goal     Problem: Patient Education: Go to Patient Education Activity  Goal: Patient/Family Education  Outcome: Progressing Towards Goal

## 2019-05-16 ENCOUNTER — APPOINTMENT (OUTPATIENT)
Dept: NON INVASIVE DIAGNOSTICS | Age: 49
DRG: 139 | End: 2019-05-16
Attending: INTERNAL MEDICINE
Payer: MEDICAID

## 2019-05-16 PROCEDURE — 74011250637 HC RX REV CODE- 250/637: Performed by: NURSE PRACTITIONER

## 2019-05-16 PROCEDURE — 93306 TTE W/DOPPLER COMPLETE: CPT

## 2019-05-16 PROCEDURE — 74011000258 HC RX REV CODE- 258: Performed by: INTERNAL MEDICINE

## 2019-05-16 PROCEDURE — 65270000029 HC RM PRIVATE

## 2019-05-16 PROCEDURE — 74011250636 HC RX REV CODE- 250/636: Performed by: INTERNAL MEDICINE

## 2019-05-16 RX ADMIN — HYDROCODONE BITARTRATE AND ACETAMINOPHEN 1 TABLET: 10; 325 TABLET ORAL at 22:38

## 2019-05-16 RX ADMIN — HYDROCODONE BITARTRATE AND ACETAMINOPHEN 1 TABLET: 10; 325 TABLET ORAL at 07:39

## 2019-05-16 RX ADMIN — HYDROCODONE BITARTRATE AND ACETAMINOPHEN 1 TABLET: 10; 325 TABLET ORAL at 19:00

## 2019-05-16 RX ADMIN — CEFAZOLIN 1 G: 1 INJECTION, POWDER, FOR SOLUTION INTRAMUSCULAR; INTRAVENOUS at 06:37

## 2019-05-16 RX ADMIN — CEFAZOLIN 1 G: 1 INJECTION, POWDER, FOR SOLUTION INTRAMUSCULAR; INTRAVENOUS at 22:39

## 2019-05-16 RX ADMIN — CEFAZOLIN 1 G: 1 INJECTION, POWDER, FOR SOLUTION INTRAMUSCULAR; INTRAVENOUS at 16:25

## 2019-05-16 RX ADMIN — SODIUM CHLORIDE 100 ML/HR: 900 INJECTION, SOLUTION INTRAVENOUS at 19:13

## 2019-05-16 RX ADMIN — HYDROCODONE BITARTRATE AND ACETAMINOPHEN 1 TABLET: 10; 325 TABLET ORAL at 01:48

## 2019-05-16 RX ADMIN — HYDROCODONE BITARTRATE AND ACETAMINOPHEN 1 TABLET: 10; 325 TABLET ORAL at 13:41
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
## 2019-05-16 NOTE — PROGRESS NOTES
Infectious Disease progress Note    Requested by: dr. Azam Owens    Reason: MSSA bacteremia, sepsis, pneumonia    Current abx Prior abx   Cefazolin since 5/14/19 Ceftriaxone 5/12  Levofloxacin, vancomycin  5/12/19-5/14/19     Lines:       Assessment :    47 y.o. male with past medical history of schizophrenia, bipolar disorder admitted to [facility] on 5/12/19 due to positive blood cultures. Clinical picture consistent with sepsis (POA) due to MSSA bloodstream infection (positive blood cultures 5/11/19, 5/12/19, negative blood cultures 5/15). Pleuritic chest pain likely due to rib fracture as seen on ct scan 5/14. No evidence of septic pulmonary emboli    Source of bacteremia not entirely clear - ? Due to undiagnosed skin trauma. Patient is a plasma donor - donates every 2 weeks - last donated 5/9/19    Infiltrates on cxr: r/o septic pulmonary emboli. Low clinical probability of community acquired pneumonia. Clinically better. Insists that he wants to leave. Refuses blood culture, further work up. Recommendations:    1. Continue cefazolin  2. obtain  2D echo  3. F/u Repeat blood cultures 5/15  4. Will place picc line for outpatient iv abx in am if repeat blood cultures 5/15 remain negative. D/w patient. Agrees with this. Will prefer to get abx at [facility] rather than home. 5. Should hold off on plasma donation till antibiotics completed. Advance Care planning: full code: discussed  with patient/surrogate decision maker:Pamela doll Cerise: [phone]      Above plan was discussed in details with patient, primary team. Please call me if any further questions or concerns. Will continue to participate in the care of this patient. HPI:    Feels better. No new complaints. he denies any neck pain, dysuria, skin rashes or lesions, shortness of breath, or other associated symptoms.     He denies back pain.           home Medication List    Details   calcium polycarbophil (FIBER-LAX) 625 mg tablet Take 625 mg by mouth two (2) times a day. raNITIdine (ZANTAC) 150 mg tablet Take 150 mg by mouth daily. !! OLANZapine (ZYPREXA) 10 mg tablet Take 1 Tab by mouth nightly. Qty: 90 Tab, Refills: 3    Associated Diagnoses: Schizophrenia, unspecified type ([facility] [address] H/O bipolar disorder; Medication refill      !! OLANZapine (ZYPREXA) 10 mg tablet Take 1 Tab by mouth nightly. Qty: 90 Tab, Refills: 0    Associated Diagnoses: Schizophrenia, unspecified type ([facility] [address] H/O bipolar disorder; Medication refill      lithium carbonate 300 mg capsule Take 2 Caps by mouth every evening. Qty: 60 Cap, Refills: 0    Associated Diagnoses: Schizophrenia, unspecified type ([facility] [address] H/O bipolar disorder; Medication refill      hydrOXYzine HCl (ATARAX) 50 mg tablet Take 2 Tabs by mouth every evening. Qty: 60 Tab, Refills: 0    Associated Diagnoses: Sch[facility]ia, unspecified type ([facility] H/O bipolar disorder; Medication refill      haloperidol (HALDOL) 20 mg tablet Take 1 Tab by mouth nightly. Qty: 30 Tab, Refills: 0    Associated Diagnoses: Schizophrenia, unspecified type ([facility] H/O bipolar disorder; Medication refill      benztropine (COGENTIN) 1 mg tablet Take 1 Tab by mouth every evening. Qty: 30 Tab, Refills: 0    Associated Diagnoses: Schizophrenia, unspecified type ([facility] H/O bipolar disorder; Medication refill       !! - Potential duplicate medications found. Please discuss with provider.           Current Facility-Administered Medications   Medication Dose Route Frequency    ceFAZolin (ANCEF) 1 g in 0.9% sodium chloride (MBP/ADV) 50 mL MBP  1 g IntraVENous Q8H    famotidine (PEPCID) tablet 20 mg  20 mg Oral BID    HYDROcodone-acetaminophen (NORCO)  mg tablet 1 Tab  1 Tab Oral Q4H PRN    lithium carbonate capsule 300 mg  300 mg Oral QPM    calcium polycarbophil (FIBERCON) tablet 625 mg  1 Tab Oral BID    OLANZapine (ZyPREXA) tablet 10 mg  10 mg Oral QHS    hydrOXYzine HCl (ATARAX) tablet 100 mg  100 mg Oral QPM    haloperidol (HALDOL) tablet 20 mg  20 mg Oral QHS    benztropine (COGENTIN) tablet 1 mg  1 mg Oral QPM    0.9% sodium chloride infusion  100 mL/hr IntraVENous CONTINUOUS    acetaminophen (TYLENOL) tablet 650 mg  650 mg Oral Q4H PRN       Allergies: Patient has no known allergies. Temp (24hrs), Av.2 °F (36.8 °C), Min:97.6 °F (36.4 °C), Max:99.2 °F (37.3 °C)    Visit Vitals  /76 (BP 1 Location: Right arm, BP Patient Position: At rest)   Pulse 75   Temp 97.6 °F (36.4 °C)   Resp 18   Ht 6' 1\" (1.854 m)   Wt 66.4 kg (146 lb 4.8 oz)   SpO2 97%   BMI 19.30 kg/m²       ROS: 12 point ROS obtained in details. Pertinent positives as mentioned in HPI,   otherwise negative    Physical Exam:      Constituational: Patient is afebrile, vital signs reviewed, patient is uncomfortable in mild distress. Head: Atraumatic, normocephalic  Eyes: Normal inspection. No conjunctival injection or discharge. ENT:  No facial bruises, normal external ear and nose inspection. Neck:  Supple, non tender. normal inspection. No meningeal signs  Cardiovascular:  regular rate and rhythm, heart sounds normal without murmurs. Radial pulses 2+ and equal bilaterally  Respiratory:  No respiratory distress, breath sounds normal.  No rales or wheezing  Abdomen: soft, nontender, nondistended, no rebound or guarding, normoactive bowel sounds  Back: non-tender. No erythema or rash. No CVA tenderness. Musculoskeletal:  normal ROM, non-tender, no pedal edema. Calves soft, symmetric, and non-tender with no palpable cords. Skin: color normal, no rash, warm, dry . Neuro: awake & alert oriented ×3, no motor/sensory deficit. Upper extremity and lower extremity strength and sensation are grossly normal, intact, and symmetric. CN 3-12 normal, intact, and symmetric.   Psych: mood/affect normal.        Labs: Results:   Chemistry Recent Labs     19  0223   GLU 92      K 3.7      CO2 23   BUN 9   CREA 0.64   CA 8.0*   AGAP 7   BUCR 14 CBC w/Diff Recent Labs     05/15/19  0310 05/14/19  0223   WBC 5.4 7.4   RBC 3.79* 3.65*   HGB 12.2* 11.8*   HCT 34.7* 33.7*   * 106*   GRANS 58 71   LYMPH 21 15*   EOS 2 1      Microbiology Recent Labs     05/15/19  1253   CULT NO GROWTH AFTER 17 HOURS          RADIOLOGY:    All available imaging studies/reports in [facility] for this admission were reviewed    Dr. Avel Pierce, Infectious Disease Specialist  [phone]  May 16, 2019  1:02 PM

## 2019-05-16 NOTE — PROGRESS NOTES
Echocardiogram completed. Patient returned to room with armband in place. Report to follow.     [address] [facility]

## 2019-05-16 NOTE — ROUTINE PROCESS
1941 Received report on patient from off going nurse, Tamea Shone. Patient currently not in room. 2015 Nursing supervisor, [id number] [facility], and Dr. Myla Suh notified of patient still currently being off the unit. 2049 Patient returned to unit and stated, \"the nurse manager Huy Arevalo walked me down to one of the conference rooms for me to attend my AA meeting. The meeting was over at 2030. \"    2053 Physician and supervisor updated. Will resume care of patient at this time. Informed patient not to leave unit without notifying nursing staff. 2055 Assumed care of patient. Patient resting in bed. No distress noted. Call bell within reach, siderails up x 3, bed in lowest position, and patient instructed to use call bell for assistance. Will continue to monitor. 2133 Patient agreed to having new IV placed. #22 inserted R above LAC. Good blood return and flushes fine. No infiltration noted. 9701 Bedside and Verbal shift change report given to [address] [facility] (oncoming nurse) by Anita Cates RN(offgoing nurse). Report included the following information Kardex, Intake/Output, MAR and Recent Results.

## 2019-05-16 NOTE — ROUTINE PROCESS
Bedside shift change report given to Negro Sarabia (oncoming nurse) by Edmundo Kulkarni (offgoing nurse). Report included the following information SBAR, Kardex, Intake/Output, MAR and Recent Results.

## 2019-05-16 NOTE — PROGRESS NOTES
Patient is not available to be assessed at this time. Patient is in Echo at the time of visit.       [facility] Res[facility] [address]   [phone]

## 2019-05-16 NOTE — PROGRESS NOTES
Problem: Falls - Risk of  Goal: *Absence of Falls  Description  Document Jose Monteiro Fall Risk and appropriate interventions in the flowsheet.   Outcome: Progressing Towards Goal     Problem: Pain  Goal: *Control of Pain  Outcome: Progressing Towards Goal

## 2019-05-17 VITALS
HEIGHT: 73 IN | WEIGHT: 146 LBS | OXYGEN SATURATION: 100 % | HEART RATE: 69 BPM | RESPIRATION RATE: 18 BRPM | DIASTOLIC BLOOD PRESSURE: 68 MMHG | TEMPERATURE: 98.1 F | BODY MASS INDEX: 19.35 KG/M2 | SYSTOLIC BLOOD PRESSURE: 121 MMHG

## 2019-05-17 LAB
ECHO AO ROOT DIAM: 3.53 CM
ECHO IVC SNIFF: 1.94 CM
ECHO LA AREA 4C: 16.2 CM2
ECHO LA VOL 2C: 60.94 ML (ref 18–58)
ECHO LA VOL 4C: 40.24 ML (ref 18–58)
ECHO LA VOL BP: 54.55 ML (ref 18–58)
ECHO LA VOL/BSA BIPLANE: 28.98 ML/M2 (ref 16–28)
ECHO LA VOLUME INDEX A2C: 32.37 ML/M2 (ref 16–28)
ECHO LA VOLUME INDEX A4C: 21.38 ML/M2 (ref 16–28)
ECHO LV EDV A2C: 107.8 ML
ECHO LV EDV A4C: 84.8 ML
ECHO LV EDV BP: 102.4 ML (ref 67–155)
ECHO LV EDV INDEX A4C: 45 ML/M2
ECHO LV EDV INDEX BP: 54.4 ML/M2
ECHO LV EDV NDEX A2C: 57.3 ML/M2
ECHO LV EJECTION FRACTION A2C: 64 %
ECHO LV EJECTION FRACTION A4C: 57 %
ECHO LV EJECTION FRACTION BIPLANE: 59.8 % (ref 55–100)
ECHO LV ESV A2C: 39.3 ML
ECHO LV ESV A4C: 36.6 ML
ECHO LV ESV BP: 41.1 ML (ref 22–58)
ECHO LV ESV INDEX A2C: 20.9 ML/M2
ECHO LV ESV INDEX A4C: 19.4 ML/M2
ECHO LV ESV INDEX BP: 21.8 ML/M2
ECHO LV INTERNAL DIMENSION DIASTOLIC: 5.05 CM (ref 4.2–5.9)
ECHO LV INTERNAL DIMENSION SYSTOLIC: 3.91 CM
ECHO LV IVSD: 0.68 CM (ref 0.6–1)
ECHO LV MASS 2D: 180.4 G (ref 88–224)
ECHO LV MASS INDEX 2D: 95.8 G/M2 (ref 49–115)
ECHO LV POSTERIOR WALL DIASTOLIC: 1.08 CM (ref 0.6–1)
ECHO LVOT DIAM: 2.24 CM
ECHO LVOT PEAK GRADIENT: 3 MMHG
ECHO LVOT PEAK VELOCITY: 85.99 CM/S
ECHO LVOT VTI: 17.76 CM
ECHO MV A VELOCITY: 60.84 CM/S
ECHO MV E DECELERATION TIME (DT): 198.6 MS
ECHO MV E VELOCITY: 59.33 CM/S
ECHO MV E/A RATIO: 0.98
ECHO TV REGURGITANT MAX VELOCITY: 232.17 CM/S
ECHO TV REGURGITANT PEAK GRADIENT: 21.6 MMHG

## 2019-05-17 PROCEDURE — 74011000258 HC RX REV CODE- 258: Performed by: INTERNAL MEDICINE

## 2019-05-17 PROCEDURE — 74011250637 HC RX REV CODE- 250/637: Performed by: NURSE PRACTITIONER

## 2019-05-17 PROCEDURE — 74011250636 HC RX REV CODE- 250/636: Performed by: INTERNAL MEDICINE

## 2019-05-17 RX ORDER — HYDROCODONE BITARTRATE AND ACETAMINOPHEN 10; 325 MG/1; MG/1
1 TABLET ORAL
Qty: 8 TAB | Refills: 0 | Status: SHIPPED | OUTPATIENT
Start: 2019-05-17 | End: 2019-05-19

## 2019-05-17 RX ORDER — VANCOMYCIN/0.9 % SOD CHLORIDE 1.5G/250ML
1500 PLASTIC BAG, INJECTION (ML) INTRAVENOUS ONCE
Status: COMPLETED | OUTPATIENT
Start: 2019-05-17 | End: 2019-05-17

## 2019-05-17 RX ORDER — SODIUM CHLORIDE 0.9 % (FLUSH) 0.9 %
10-40 SYRINGE (ML) INJECTION AS NEEDED
Status: DISCONTINUED | OUTPATIENT
Start: 2019-05-18 | End: 2019-05-22 | Stop reason: HOSPADM

## 2019-05-17 RX ADMIN — HYDROCODONE BITARTRATE AND ACETAMINOPHEN 1 TABLET: 10; 325 TABLET ORAL at 06:45

## 2019-05-17 RX ADMIN — CEFAZOLIN 1 G: 1 INJECTION, POWDER, FOR SOLUTION INTRAMUSCULAR; INTRAVENOUS at 09:22

## 2019-05-17 RX ADMIN — HYDROCODONE BITARTRATE AND ACETAMINOPHEN 1 TABLET: 10; 325 TABLET ORAL at 02:45

## 2019-05-17 RX ADMIN — HYDROCODONE BITARTRATE AND ACETAMINOPHEN 1 TABLET: 10; 325 TABLET ORAL at 15:00

## 2019-05-17 RX ADMIN — HYDROCODONE BITARTRATE AND ACETAMINOPHEN 1 TABLET: 10; 325 TABLET ORAL at 09:22

## 2019-05-17 RX ADMIN — SODIUM CHLORIDE 100 ML/HR: 900 INJECTION, SOLUTION INTRAVENOUS at 09:27

## 2019-05-17 RX ADMIN — VANCOMYCIN HYDROCHLORIDE 1500 MG: 10 INJECTION, POWDER, LYOPHILIZED, FOR SOLUTION INTRAVENOUS at 13:17

## 2019-05-17 NOTE — PROGRESS NOTES
Infectious Disease progress Note    Requested by: dr. Marcia Mayes    Reason: MSSA bacteremia, sepsis, pneumonia    Current abx Prior abx   Cefazolin since 5/14/19 Ceftriaxone 5/12  Levofloxacin, vancomycin  5/12/19-5/14/19     Lines:       Assessment :    47 y.o. male with past medical history of schizophrenia, bipolar disorder admitted to [facility] on 5/12/19 due to positive blood cultures. Clinical picture consistent with sepsis (POA) due to MSSA bloodstream infection (positive blood cultures 5/11/19, 5/12/19, negative blood cultures 5/15). Pleuritic chest pain likely due to rib fracture as seen on ct scan 5/14. No evidence of septic pulmonary emboli    Source of bacteremia not entirely clear - ? Due to undiagnosed skin trauma. Patient is a plasma donor - donates every 2 weeks - last donated 5/9/19    Infiltrates on cxr: r/o septic pulmonary emboli. Low clinical probability of community acquired pneumonia. Clinically better. Insists that he wants to leave. Refuses blood culture, further work up. Recommendations:    1. discontinue cefazolin and give vancomycin one dose in anticipation of discharge  2. Recommend to place picc line for 4 weeks outpatient iv abx for mssa bacteremia. D/w patient. His work wont allow him to get picc line. Under these circumstances, best alternative would be to give antibiotic with long half life and infrequent dosing. Hence, recommend to arrange for outpatient iv dalbavancin 1500 mg iv one dose on 5/18 and next dose on 5/26.   3. Should hold off on plasma donation till antibiotics completed, infection cleared. Advance Care planning: full code: discussed  with patient/surrogate decision maker:Bettina doll Sites: [phone]      Above plan was discussed in details with patient, primary team, . All questions answered to their full satisfaction. Spent additional 35 minutes in management and evaluation of this patient.  >50% time spent in counselling and coordination of care.   Please call me if any further questions or concerns. Will continue to participate in the care of this patient. HPI:    Feels better. No new complaints. he denies any neck pain, dysuria, skin rashes or lesions, shortness of breath, or other associated symptoms. He denies back pain.           home Medication List    Details   calcium polycarbophil (FIBER-LAX) 625 mg tablet Take 625 mg by mouth two (2) times a day. raNITIdine (ZANTAC) 150 mg tablet Take 150 mg by mouth daily. !! OLANZapine (ZYPREXA) 10 mg tablet Take 1 Tab by mouth nightly. Qty: 90 Tab, Refills: 3    Associated Diagnoses: Schizophrenia, unspecified type ([address] H/O bipolar disorder; Medication refill      !! OLANZapine (ZYPREXA) 10 mg tablet Take 1 Tab by mouth nightly. Qty: 90 Tab, Refills: 0    Associated Diagnoses: Schizophrenia, unspecified type ([address] H/O bipolar disorder; Medication refill      lithium carbonate 300 mg capsule Take 2 Caps by mouth every evening. Qty: 60 Cap, Refills: 0    Associated Diagnoses: Schizophrenia, unspecified type ([address] H/O bipolar disorder; Medication refill      hydrOXYzine HCl (ATARAX) 50 mg tablet Take 2 Tabs by mouth every evening. Qty: 60 Tab, Refills: 0    Associated Diagnoses: Schizophrenia, unspecified type ([address] H/O bipolar disorder; Medication refill      haloperidol (HALDOL) 20 mg tablet Take 1 Tab by mouth nightly. Qty: 30 Tab, Refills: 0    Associated Diagnoses: Schizophrenia, unspecified type ([address] H/O bipolar disorder; Medication refill      benztropine (COGENTIN) 1 mg tablet Take 1 Tab by mouth every evening. Qty: 30 Tab, Refills: 0    Associated Diagnoses: Schizophrenia, unspecified type ([address] H/O bipolar disorder; Medication refill       !! - Potential duplicate medications found. Please discuss with provider.           Current Facility-Administered Medications   Medication Dose Route Frequency    ceFAZolin (ANCEF) 1 g in 0.9% sodium chloride (MBP/ADV) 50 mL MBP  1 g IntraVENous Q8H    famotidine (PEPCID) tablet 20 mg  20 mg Oral BID    HYDROcodone-acetaminophen (NORCO)  mg tablet 1 Tab  1 Tab Oral Q4H PRN    lithium carbonate capsule 300 mg  300 mg Oral QPM    calcium polycarbophil (FIBERCON) tablet 625 mg  1 Tab Oral BID    OLANZapine (ZyPREXA) tablet 10 mg  10 mg Oral QHS    hydrOXYzine HCl (ATARAX) tablet 100 mg  100 mg Oral QPM    haloperidol (HALDOL) tablet 20 mg  20 mg Oral QHS    benztropine (COGENTIN) tablet 1 mg  1 mg Oral QPM    0.9% sodium chloride infusion  100 mL/hr IntraVENous CONTINUOUS    acetaminophen (TYLENOL) tablet 650 mg  650 mg Oral Q4H PRN       Allergies: Patient has no known allergies. Temp (24hrs), Av.7 °F (36.5 °C), Min:97.4 °F (36.3 °C), Max:98.1 °F (36.7 °C)    Visit Vitals  /71 (BP 1 Location: Left arm, BP Patient Position: At rest)   Pulse 69   Temp 97.4 °F (36.3 °C)   Resp 20   Ht 6' 1\" (1.854 m)   Wt 66.2 kg (146 lb)   SpO2 98%   BMI 19.26 kg/m²       ROS: 12 point ROS obtained in details. Pertinent positives as mentioned in HPI,   otherwise negative    Physical Exam:      Constituational: Patient is afebrile, vital signs reviewed, patient is uncomfortable in mild distress. Head: Atraumatic, normocephalic  Eyes: Normal inspection. No conjunctival injection or discharge. ENT:  No facial bruises, normal external ear and nose inspection. Neck:  Supple, non tender. normal inspection. No meningeal signs  Cardiovascular:  regular rate and rhythm, heart sounds normal without murmurs. Radial pulses 2+ and equal bilaterally  Respiratory:  No respiratory distress, breath sounds normal.  No rales or wheezing  Abdomen: soft, nontender, nondistended, no rebound or guarding, normoactive bowel sounds  Back: non-tender. No erythema or rash. No CVA tenderness. Musculoskeletal:  normal ROM, non-tender, no pedal edema. Calves soft, symmetric, and non-tender with no palpable cords.    Skin: color normal, no rash, warm, dry .  Neuro: awake & alert oriented ×3, no motor/sensory deficit. Upper extremity and lower extremity strength and sensation are grossly normal, intact, and symmetric. CN 3-12 normal, intact, and symmetric. Psych: mood/affect normal.        Labs: Results:   Chemistry No results for input(s): GLU, NA, K, CL, CO2, BUN, CREA, CA, AGAP, BUCR, TBIL, GPT, AP, TP, ALB, GLOB, AGRAT in the last 72 hours.    CBC w/Diff Recent Labs     05/15/19  0310   WBC 5.4   RBC 3.79*   HGB 12.2*   HCT 34.7*   *   GRANS 58   LYMPH 21   EOS 2      Microbiology Recent Labs     05/15/19  1253   CULT NO GROWTH 2 DAYS          RADIOLOGY:    All available imaging studies/reports in [facility] for this admission were reviewed    Dr. Connie Castellanos, Infectious Disease Specialist  [phone]  May 17, 2019  1:02 PM

## 2019-05-17 NOTE — PROGRESS NOTES
Per [facility] () called [employer] transportation at  [phone] to schedule cab transport to patient home ([address] [facility], [id number] [place] [address]) with Byron Goncalves and received confirmation number [id number]. Per Byron Goncalves transport span is from 30 minutes to 3 hours. Dispatch will call the nurse's station with name of company and ETA. Set up transport from patient's home to [facility] infusion center [address] for Saturday, May 18, 2019 confirmation number [id number] and Saturday, May 25, 2019 confirmation number [id number] at 9:00 am with Byron Goncalves. Per Byron Goncalves  time for both Saturdays infusion visits are at 8:30 am.  Patient should be ready for transport at 8:00 am. Both trips are scheduled as round trip appointments. Informed [place] of transportation arrangements.
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
## 2019-05-17 NOTE — PROGRESS NOTES
Discharge Planning:  · This pt will discharged to home today  · Pt will have paperwork for request for weekly infusion at the [address] [place] [address]  · The goal is to start infusion on 5/18/19 at the assigned facilities  · Pt has his first infusion at [facility] at 9:00 a.m. 5/18/19. · Pt provided and offered  Bus Tickets, which this pt was agreeable with.   · This pt did a conference with a person who identified herself as this pt [facility]

## 2019-05-17 NOTE — DISCHARGE INSTRUCTIONS
Patient Education        Pneumonia: Care Instructions  Your Care Instructions    Pneumonia is an infection of the lungs. Most cases are caused by infections from bacteria or viruses. Pneumonia may be mild or very severe. If it is caused by bacteria, you will be treated with antibiotics. It may take a few weeks to a few months to recover fully from pneumonia, depending on how sick you were and whether your overall health is good. Follow-up care is a key part of your treatment and safety. Be sure to make and go to all appointments, and call your doctor if you are having problems. It's also a good idea to know your test results and keep a list of the medicines you take. How can you care for yourself at home? · Take your antibiotics exactly as directed. Do not stop taking the medicine just because you are feeling better. You need to take the full course of antibiotics. · Take your medicines exactly as prescribed. Call your doctor if you think you are having a problem with your medicine. · Get plenty of rest and sleep. You may feel weak and tired for a while, but your energy level will improve with time. · To prevent dehydration, drink plenty of fluids, enough so that your urine is light yellow or clear like water. Choose water and other caffeine-free clear liquids until you feel better. If you have kidney, heart, or liver disease and have to limit fluids, talk with your doctor before you increase the amount of fluids you drink. · Take care of your cough so you can rest. A cough that brings up mucus from your lungs is common with pneumonia. It is one way your body gets rid of the infection. But if coughing keeps you from resting or causes severe fatigue and chest-wall pain, talk to your doctor. He or she may suggest that you take a medicine to reduce the cough. · Use a vaporizer or humidifier to add moisture to your bedroom. Follow the directions for cleaning the machine.   · Do not smoke or allow others to smoke around you. Smoke will make your cough last longer. If you need help quitting, talk to your doctor about stop-smoking programs and medicines. These can increase your chances of quitting for good. · Take an over-the-counter pain medicine, such as acetaminophen (Tylenol), ibuprofen (Advil, Motrin), or naproxen (Aleve). Read and follow all instructions on the label. · Do not take two or more pain medicines at the same time unless the doctor told you to. Many pain medicines have acetaminophen, which is Tylenol. Too much acetaminophen (Tylenol) can be harmful. · If you were given a spirometer to measure how well your lungs are working, use it as instructed. This can help your doctor tell how your recovery is going. · To prevent pneumonia in the future, talk to your doctor about getting a flu vaccine (once a year) and a pneumococcal vaccine (one time only for most people). When should you call for help? Call 911 anytime you think you may need emergency care. For example, call if:    · You have severe trouble breathing.    Call your doctor now or seek immediate medical care if:    · You cough up dark brown or bloody mucus (sputum).     · You have new or worse trouble breathing.     · You are dizzy or lightheaded, or you feel like you may faint.    Watch closely for changes in your health, and be sure to contact your doctor if:    · You have a new or higher fever.     · You are coughing more deeply or more often.     · You are not getting better after 2 days (48 hours).     · You do not get better as expected. Where can you learn more? Go to http://melony-venu.info/. Enter [id number] in the search box to learn more about \"Pneumonia: Care Instructions. \"  Current as of: September 5, 2018  Content Version: 11.9  © 5184-6800 [employer]. Care instructions adapted under license by [employer] (which disclaims liability or warranty for this information).  If you have questions about a medical condition or this instruction, always ask your healthcare professional. [place] [address] any warranty or liability for your use of this information. Patient armband removed and shredded  DISCHARGE SUMMARY from Nurse    PATIENT INSTRUCTIONS:    After general anesthesia or intravenous sedation, for 24 hours or while taking prescription Narcotics:  · Limit your activities  · Do not drive and operate hazardous machinery  · Do not make important personal or business decisions  · Do  not drink alcoholic beverages  · If you have not urinated within 8 hours after discharge, please contact your surgeon on call. Report the following to your surgeon:  · Excessive pain, swelling, redness or odor of or around the surgical area  · Temperature over 100.5  · Nausea and vomiting lasting longer than 4 hours or if unable to take medications  · Any signs of decreased circulation or nerve impairment to extremity: change in color, persistent  numbness, tingling, coldness or increase pain  · Any questions    What to do at Home:  Recommended activity: Activity as tolerated,     If you experience any of the following symptoms persistent fever, nausea, vomiting, diarrhea, please follow up with primary care doctor. *  Please give a list of your current medications to your Primary Care Provider. *  Please update this list whenever your medications are discontinued, doses are      changed, or new medications (including over-the-counter products) are added. *  Please carry medication information at all times in case of emergency situations. These are general instructions for a healthy lifestyle:    No smoking/ No tobacco products/ Avoid exposure to second hand smoke  Surgeon General's Warning:  Quitting smoking now greatly reduces serious risk to your health.     Obesity, smoking, and sedentary lifestyle greatly increases your risk for illness    A healthy diet, regular physical exercise & weight monitoring are important for maintaining a healthy lifestyle    You may be retaining fluid if you have a history of heart failure or if you experience any of the following symptoms:  Weight gain of 3 pounds or more overnight or 5 pounds in a week, increased swelling in our hands or feet or shortness of breath while lying flat in bed. Please call your doctor as soon as you notice any of these symptoms; do not wait until your next office visit. Recognize signs and symptoms of STROKE:    F-face looks uneven    A-arms unable to move or move unevenly    S-speech slurred or non-existent    T-time-call 911 as soon as signs and symptoms begin-DO NOT go       Back to bed or wait to see if you get better-TIME IS BRAIN. Warning Signs of HEART ATTACK     Call 911 if you have these symptoms:   Chest discomfort. Most heart attacks involve discomfort in the center of the chest that lasts more than a few minutes, or that goes away and comes back. It can feel like uncomfortable pressure, squeezing, fullness, or pain.  Discomfort in other areas of the upper body. Symptoms can include pain or discomfort in one or both arms, the back, neck, jaw, or stomach.  Shortness of breath with or without chest discomfort.  Other signs may include breaking out in a cold sweat, nausea, or lightheadedness. Don't wait more than five minutes to call 911 - MINUTES MATTER! Fast action can save your life. Calling 911 is almost always the fastest way to get lifesaving treatment. Emergency Medical Services staff can begin treatment when they arrive -- up to an hour sooner than if someone gets to the hospital by car. The discharge information has been reviewed with the patient. The patient verbalized understanding.   Discharge medications reviewed with the patient and appropriate educational materials and side effects teaching were provided.   ___________________________________________________________________________________________________________________________________

## 2019-05-18 ENCOUNTER — HOSPITAL ENCOUNTER (OUTPATIENT)
Dept: INFUSION THERAPY | Age: 49
Discharge: HOME | End: 2019-05-18
Payer: MEDICAID

## 2019-05-18 VITALS
TEMPERATURE: 98 F | HEART RATE: 80 BPM | RESPIRATION RATE: 18 BRPM | DIASTOLIC BLOOD PRESSURE: 76 MMHG | SYSTOLIC BLOOD PRESSURE: 120 MMHG | OXYGEN SATURATION: 100 %

## 2019-05-18 PROCEDURE — 96365 THER/PROPH/DIAG IV INF INIT: CPT

## 2019-05-18 PROCEDURE — 74011250636 HC RX REV CODE- 250/636: Performed by: INTERNAL MEDICINE

## 2019-05-18 RX ADMIN — Medication 10 ML: at 09:41

## 2019-05-18 RX ADMIN — Medication 10 ML: at 08:56

## 2019-05-18 RX ADMIN — DALBAVANCIN 1500 MG: 500 INJECTION, POWDER, FOR SOLUTION INTRAVENOUS at 09:00

## 2019-05-18 NOTE — PROGRESS NOTES
[facility] Progress Note    Date: May 18, 2019    Name: Titus Solo    MRN: [id number]         : 1970      [place] [address] of [address]    Mr. Campos Montemayor arrived to [facility] at [address] [facility]. Denies pain. No concerns voiced    Mr. Campos Montemayor was assessed and education was provided. Care notes given. Patient verbalized understanding of actions, route, side effects, possible reaction, and management if side effects or reaction occurs     Mr. Pollack's vitals were reviewed. Visit Vitals  /76 (BP 1 Location: Left arm, BP Patient Position: At rest)   Pulse 80   Temp 98 °F (36.7 °C)   Resp 18   SpO2 100%       # 22g IV inserted in patient's LEFT AC x1 attempt. Positive for blood return/ flushes without difficulty. Dalvance 1500 mg administered over 30 minutes as ordered followed by NS flush. Mr. Campos Montemayor tolerated well without complaints. He declined to stay for observation. Patient Vitals for the past 4 hrs:   Temp Pulse Resp BP SpO2   19 0944 -- 80 18 120/76 100 %   19 0845 98 °F (36.7 °C) 72 18 112/70 99 %         IV removed/ intact. Site without redness, swelling, bleeding or tenderness. Gauze/ coban to site. Reviewed discharge instructions with patient. He verbalized understanding    Mr. Campos Montemayor was discharged from [place] [address] in stable condition at [address] He is to return on 19 at 0900 for his next appointment.     Bettina Pete RN  May 18, 2019

## 2019-05-19 ENCOUNTER — APPOINTMENT (OUTPATIENT)
Dept: INFUSION THERAPY | Age: 49
End: 2019-05-19
Payer: MEDICAID

## 2019-05-20 ENCOUNTER — APPOINTMENT (OUTPATIENT)
Dept: INFUSION THERAPY | Age: 49
End: 2019-05-20
Payer: MEDICAID

## 2019-05-21 LAB
BACTERIA SPEC CULT: NORMAL
SERVICE CMNT-IMP: NORMAL

## 2019-05-25 ENCOUNTER — HOSPITAL ENCOUNTER (OUTPATIENT)
Dept: INFUSION THERAPY | Age: 49
Discharge: HOME | End: 2019-05-25
Payer: MEDICAID

## 2019-06-01 ENCOUNTER — HOSPITAL ENCOUNTER (OUTPATIENT)
Dept: INFUSION THERAPY | Age: 49
End: 2019-06-01

## 2019-08-07 ENCOUNTER — HOSPITAL ENCOUNTER (INPATIENT)
Age: 49
LOS: 2 days | Discharge: HOME | DRG: 750 | End: 2019-08-09
Attending: EMERGENCY MEDICINE | Admitting: PSYCHIATRY & NEUROLOGY
Payer: MEDICAID

## 2019-08-07 DIAGNOSIS — F20.9 SCHIZOPHRENIA, UNSPECIFIED TYPE (HCC): ICD-10-CM

## 2019-08-07 DIAGNOSIS — Z76.0 MEDICATION REFILL: ICD-10-CM

## 2019-08-07 DIAGNOSIS — Z86.59 H/O BIPOLAR DISORDER: ICD-10-CM

## 2019-08-07 PROBLEM — F20.0 PARANOID SCHIZOPHRENIA (HCC): Status: ACTIVE | Noted: 2019-08-07

## 2019-08-07 LAB
ALBUMIN SERPL-MCNC: 3.3 G/DL (ref 3.4–5)
ALBUMIN/GLOB SERPL: 1.3 {RATIO} (ref 0.8–1.7)
ALP SERPL-CCNC: 79 U/L (ref 45–117)
ALT SERPL-CCNC: 32 U/L (ref 16–61)
AMPHET UR QL SCN: NEGATIVE
ANION GAP SERPL CALC-SCNC: 3 MMOL/L (ref 3–18)
AST SERPL-CCNC: 41 U/L (ref 10–38)
BARBITURATES UR QL SCN: NEGATIVE
BASOPHILS # BLD: 0 K/UL (ref 0–0.1)
BASOPHILS NFR BLD: 1 % (ref 0–2)
BENZODIAZ UR QL: NEGATIVE
BILIRUB SERPL-MCNC: 0.7 MG/DL (ref 0.2–1)
BUN SERPL-MCNC: 22 MG/DL (ref 7–18)
BUN/CREAT SERPL: 22 (ref 12–20)
CALCIUM SERPL-MCNC: 8.6 MG/DL (ref 8.5–10.1)
CANNABINOIDS UR QL SCN: NEGATIVE
CHLORIDE SERPL-SCNC: 108 MMOL/L (ref 100–111)
CO2 SERPL-SCNC: 32 MMOL/L (ref 21–32)
COCAINE UR QL SCN: POSITIVE
CREAT SERPL-MCNC: 1.01 MG/DL (ref 0.6–1.3)
DIFFERENTIAL METHOD BLD: ABNORMAL
EOSINOPHIL # BLD: 0.1 K/UL (ref 0–0.4)
EOSINOPHIL NFR BLD: 2 % (ref 0–5)
ERYTHROCYTE [DISTWIDTH] IN BLOOD BY AUTOMATED COUNT: 12.8 % (ref 11.6–14.5)
ETHANOL SERPL-MCNC: <3 MG/DL (ref 0–3)
GLOBULIN SER CALC-MCNC: 2.6 G/DL (ref 2–4)
GLUCOSE SERPL-MCNC: 87 MG/DL (ref 74–99)
HCT VFR BLD AUTO: 38.7 % (ref 36–48)
HDSCOM,HDSCOM: ABNORMAL
HGB BLD-MCNC: 13.8 G/DL (ref 13–16)
LYMPHOCYTES # BLD: 1.5 K/UL (ref 0.9–3.6)
LYMPHOCYTES NFR BLD: 22 % (ref 21–52)
MCH RBC QN AUTO: 33 PG (ref 24–34)
MCHC RBC AUTO-ENTMCNC: 35.7 G/DL (ref 31–37)
MCV RBC AUTO: 92.6 FL (ref 74–97)
METHADONE UR QL: NEGATIVE
MONOCYTES # BLD: 0.8 K/UL (ref 0.05–1.2)
MONOCYTES NFR BLD: 12 % (ref 3–10)
NEUTS SEG # BLD: 4.5 K/UL (ref 1.8–8)
NEUTS SEG NFR BLD: 63 % (ref 40–73)
OPIATES UR QL: POSITIVE
PCP UR QL: NEGATIVE
PLATELET # BLD AUTO: 170 K/UL (ref 135–420)
PMV BLD AUTO: 9 FL (ref 9.2–11.8)
POTASSIUM SERPL-SCNC: 3.9 MMOL/L (ref 3.5–5.5)
PROT SERPL-MCNC: 5.9 G/DL (ref 6.4–8.2)
RBC # BLD AUTO: 4.18 M/UL (ref 4.7–5.5)
SODIUM SERPL-SCNC: 143 MMOL/L (ref 136–145)
WBC # BLD AUTO: 7 K/UL (ref 4.6–13.2)

## 2019-08-07 PROCEDURE — 80307 DRUG TEST PRSMV CHEM ANLYZR: CPT

## 2019-08-07 PROCEDURE — 99285 EMERGENCY DEPT VISIT HI MDM: CPT

## 2019-08-07 PROCEDURE — 80053 COMPREHEN METABOLIC PANEL: CPT

## 2019-08-07 PROCEDURE — 65220000005 HC RM SEMIPRIVATE PSYCH 3 OR 4 BED

## 2019-08-07 PROCEDURE — 85025 COMPLETE CBC W/AUTO DIFF WBC: CPT

## 2019-08-07 PROCEDURE — 74011250637 HC RX REV CODE- 250/637: Performed by: PSYCHIATRY & NEUROLOGY

## 2019-08-07 RX ORDER — HALOPERIDOL 5 MG/1
5 TABLET ORAL
Status: DISCONTINUED | OUTPATIENT
Start: 2019-08-07 | End: 2019-08-08

## 2019-08-07 RX ORDER — BENZTROPINE MESYLATE 1 MG/1
1 TABLET ORAL 2 TIMES DAILY
Status: DISCONTINUED | OUTPATIENT
Start: 2019-08-07 | End: 2019-08-08

## 2019-08-07 RX ORDER — HALOPERIDOL 5 MG/1
5 TABLET ORAL 2 TIMES DAILY
Status: DISCONTINUED | OUTPATIENT
Start: 2019-08-07 | End: 2019-08-08

## 2019-08-07 RX ORDER — TRAZODONE HYDROCHLORIDE 50 MG/1
50 TABLET ORAL
Status: DISCONTINUED | OUTPATIENT
Start: 2019-08-07 | End: 2019-08-09 | Stop reason: HOSPADM

## 2019-08-07 RX ORDER — HYDROXYZINE PAMOATE 25 MG/1
25 CAPSULE ORAL
Status: DISCONTINUED | OUTPATIENT
Start: 2019-08-07 | End: 2019-08-07 | Stop reason: SDUPTHER

## 2019-08-07 RX ORDER — IBUPROFEN 600 MG/1
600 TABLET ORAL
Status: DISCONTINUED | OUTPATIENT
Start: 2019-08-07 | End: 2019-08-09 | Stop reason: HOSPADM

## 2019-08-07 RX ORDER — HYDROXYZINE PAMOATE 50 MG/1
50 CAPSULE ORAL
Status: DISCONTINUED | OUTPATIENT
Start: 2019-08-07 | End: 2019-08-09 | Stop reason: HOSPADM

## 2019-08-07 RX ORDER — HALOPERIDOL 5 MG/ML
5 INJECTION INTRAMUSCULAR
Status: DISCONTINUED | OUTPATIENT
Start: 2019-08-07 | End: 2019-08-08

## 2019-08-07 RX ADMIN — HALOPERIDOL 5 MG: 5 TABLET ORAL at 20:12

## 2019-08-07 RX ADMIN — BENZTROPINE MESYLATE 1 MG: 1 TABLET ORAL at 20:13

## 2019-08-07 RX ADMIN — HYDROXYZINE PAMOATE 50 MG: 25 CAPSULE ORAL at 14:40

## 2019-08-07 NOTE — BH NOTES
Patient arrives via wheelchair. Complains of AVH. No SI or HI. Interacts with staff and peers. Isolative. Stays to self in room. Isolative withdrawn. Quiet. Gripper socks and 15 minute checks in place for safety. Unsteady. Wound consult ordered. Open skin on right foot under toes on sole. Feet sore reddened per patient statement. Cooperative. Takes medicines without incident. Eats and tolerates evening meal later. Patient states he has been off his medicines for awhile. Will continue to monitor and support.

## 2019-08-07 NOTE — BSMART NOTE
Comprehensive Assessment Form Part 1    Section I - Disposition        The plan is to admit to [facility]  The on-call Psychiatrist consulted was Dr. Melody Lunsford  The admitting Psychiatrist will be Dr. Bianca Camargo  The admitting Diagnosis is Paranoid Schizophrenia             Section II - Integrated Summary    This is a 47year old male who presented in the ED stating that he is paranoid and has been hearing voices lately. He says he has a diagnosis of Paranoid Schizophrenia and Bipolar Disorder. States he has been off of his medications for a while. He remembers taking Haldol and Lithium but can't remember the rest. He reports having racing thoughts and says he can't remember anything. States for the past 4 days he has done nothing but get high. He said he hasn't been eating or sleeping. He was sitting on the bed eating a lot of candy and cookies. States on Monday he was walking across the bridge and was thinking how much simpler it would be to just jump off. He denies current suicidal thoughts. He continues to hear voices but not sure what they are saying. He has spent several years in prison off and on he says for stealing. Claims he had psych treatment while. He has a  who he says he called today to let him know he was here. UDS positive for cocaine and opiates.                     Shwetha Farmer RN

## 2019-08-07 NOTE — ED TRIAGE NOTES
Pt reports suicidal ideations and hallucinations after he stopped taking psych medications. Denies plan.

## 2019-08-08 PROBLEM — F20.9 SCHIZOPHRENIA (HCC): Status: RESOLVED | Noted: 2018-05-07 | Resolved: 2019-08-08

## 2019-08-08 PROBLEM — F14.20 COCAINE USE DISORDER, SEVERE, DEPENDENCE (HCC): Status: ACTIVE | Noted: 2019-08-08

## 2019-08-08 PROBLEM — J18.9 CAP (COMMUNITY ACQUIRED PNEUMONIA): Status: RESOLVED | Noted: 2019-05-12 | Resolved: 2019-08-08

## 2019-08-08 PROBLEM — R50.9 FEBRILE ILLNESS: Status: RESOLVED | Noted: 2019-05-12 | Resolved: 2019-08-08

## 2019-08-08 PROBLEM — Z86.59 H/O BIPOLAR DISORDER: Status: RESOLVED | Noted: 2018-05-07 | Resolved: 2019-08-08

## 2019-08-08 PROBLEM — F25.0 SCHIZOAFFECTIVE DISORDER, BIPOLAR TYPE (HCC): Status: ACTIVE | Noted: 2019-08-07

## 2019-08-08 PROCEDURE — 74011250637 HC RX REV CODE- 250/637: Performed by: PSYCHIATRY & NEUROLOGY

## 2019-08-08 PROCEDURE — 65220000005 HC RM SEMIPRIVATE PSYCH 3 OR 4 BED

## 2019-08-08 RX ORDER — OLANZAPINE 10 MG/1
10 TABLET ORAL EVERY EVENING
Status: DISCONTINUED | OUTPATIENT
Start: 2019-08-08 | End: 2019-08-09 | Stop reason: HOSPADM

## 2019-08-08 RX ORDER — OLANZAPINE 5 MG/1
5 TABLET, ORALLY DISINTEGRATING ORAL
Status: DISCONTINUED | OUTPATIENT
Start: 2019-08-08 | End: 2019-08-09 | Stop reason: HOSPADM

## 2019-08-08 RX ADMIN — HALOPERIDOL 5 MG: 5 TABLET ORAL at 08:29

## 2019-08-08 RX ADMIN — OLANZAPINE 10 MG: 10 TABLET, FILM COATED ORAL at 18:06

## 2019-08-08 RX ADMIN — BENZTROPINE MESYLATE 1 MG: 1 TABLET ORAL at 08:31

## 2019-08-08 NOTE — BSMART NOTE
ART THERAPY GROUP PROGRESS NOTE    Group time:3137    The patient did not awaken/get up when called for group.

## 2019-08-08 NOTE — WOUND CARE
Physical Exam   Room 106/03: assess feet  Pt's tops of toes have redness, pt states he had shoes that didn't fit well before admission. Plantar foot redness with no periwound redness. Callous to heels & high pressure areas. No open areas to dress. Suggest pt to cleanse feet with soap & water daily in shower, change socks daily. Education provided to pt on treatment. Updated Pancho Last RN primary nurse on plan of care. Will turn over care to nursing staff at this time.   Mo CRUZN, RN, [facility], [address]

## 2019-08-08 NOTE — BSMART NOTE
SOCIAL WORK GROUP THERAPY PROGRESS NOTE    Group Time:  9:30am    Group Topic:  Coping Skills    Group Participation:      Pt reportedly did not attend group due to medical issues & was resting in room. Seemed lethargic & rather dysphoric.

## 2019-08-08 NOTE — BSMART NOTE
OCCUPATIONAL THERAPY PROGRESS NOTE    Group Time:  0621  Attendance: The patient attended full group. .  The patient left and returned to activity at least once. Participation:  The patient participated with minimal elaboration in the activity. Attention:  The patient was able to focus on the activity. Interaction:  The patient acknowledges others or responds to questions,  with no spontaneous interaction. Participated as asked with little detail in answers.

## 2019-08-08 NOTE — BH NOTES
LOUISA Note: The above pt has been in the bed majority of the shift. He has not been a management problem this shift.

## 2019-08-08 NOTE — BSMART NOTE
SW assessment/Intervention:  Sonia Galaviz is a 47 y.o. WHITE OR  male with a history of Schizophrenia and Bipolar Disorder who was admitted from ED for suicidal ideation and hallucinations. Patient is observed in the milieu engaged with peers. Patients affect is calm. He is polite and is able to clearly express his thoughts. Patient reports a history of substance abuse. He reports he was sober for the 5 years however, relapsed recently due to major stressors. Patient reports he is interested in long term treatment. SW will assist with discharge and possible transition to treatment center.     German Uriostegui, [facility]
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
## 2019-08-09 VITALS
TEMPERATURE: 97.1 F | HEART RATE: 66 BPM | SYSTOLIC BLOOD PRESSURE: 92 MMHG | OXYGEN SATURATION: 98 % | RESPIRATION RATE: 18 BRPM | DIASTOLIC BLOOD PRESSURE: 64 MMHG

## 2019-08-09 LAB
CHOLEST SERPL-MCNC: 154 MG/DL
EST. AVERAGE GLUCOSE BLD GHB EST-MCNC: NORMAL MG/DL
HBA1C MFR BLD: 4.7 % (ref 4.2–5.6)
HDLC SERPL-MCNC: 62 MG/DL (ref 40–60)
HDLC SERPL: 2.5 {RATIO} (ref 0–5)
LDLC SERPL CALC-MCNC: 81.8 MG/DL (ref 0–100)
LIPID PROFILE,FLP: ABNORMAL
TRIGL SERPL-MCNC: 51 MG/DL (ref ?–150)
TSH SERPL DL<=0.05 MIU/L-ACNC: 0.75 UIU/ML (ref 0.36–3.74)
VLDLC SERPL CALC-MCNC: 10.2 MG/DL

## 2019-08-09 PROCEDURE — 36415 COLL VENOUS BLD VENIPUNCTURE: CPT

## 2019-08-09 PROCEDURE — 83036 HEMOGLOBIN GLYCOSYLATED A1C: CPT

## 2019-08-09 PROCEDURE — 84443 ASSAY THYROID STIM HORMONE: CPT

## 2019-08-09 PROCEDURE — 80061 LIPID PANEL: CPT

## 2019-08-09 RX ORDER — OLANZAPINE 10 MG/1
10 TABLET ORAL
Qty: 30 TAB | Refills: 0 | Status: SHIPPED | OUTPATIENT
Start: 2019-08-09

## 2019-08-09 NOTE — DISCHARGE INSTRUCTIONS
BEHAVIORAL HEALTH NURSING DISCHARGE NOTE      The following personal items collected during your admission are returned to you:   Dental Appliance: Dental Appliances: (P) None  Vision: Visual Aid: None  Hearing Aid:    Jewelry: Jewelry: (P) None  Clothing:    Other Valuables: Other Valuables: (P) None  Valuables sent to safe:        PATIENT INSTRUCTIONS:      Regular diet        The discharge information has been reviewed with the patient. The patient verbalized understanding.       Patient armband removed and shredded

## 2019-08-09 NOTE — BSMART NOTE
DEDRA assessment/Intervention:  Patient is prepared for discharge. Denies the need for substance abuse treatment. Denies SI/HI. Denies AVH. Patient reports he will return to [facility] for continued services.     DEYVI GustafsonE

## 2019-08-09 NOTE — PROGRESS NOTES
Problem: Falls - Risk of  Goal: *Absence of Falls  Description  Document Zenaida Tang Fall Risk and appropriate interventions in the flowsheet. Absent of falls daily while in hospital.   Outcome: Progressing Towards Goal  Note:   Fall Risk Interventions:            Medication Interventions: Teach patient to arise slowly                   Problem: Crack/Cocaine Withdrawal  Goal: *STG: Remains safe in hospital  Description  Remains safe daily while in hospital.   Outcome: Progressing Towards Goal     Problem: Crack/Cocaine Withdrawal  Goal: *STG: Complies with medication therapy  Description  Complies with medication therapy daily while in hospital.   Outcome: Progressing Towards Goal     Patient has been isolative to room except for meals and medication. He denied suicidal/homicidal ideations. He continues to endorse auditory hallucinations however he denies visual hallucinations. He stated the came to the hospital \"Because I was off my medication\". He was medication compliant. Nursing will continue to provide safety and support.

## 2019-08-09 NOTE — PROGRESS NOTES
Problem: Falls - Risk of  Goal: *Absence of Falls  Description  Document Mason Trinidads Fall Risk and appropriate interventions in the flowsheet. Absent of falls daily while in hospital.   Outcome: Progressing Towards Goal  Note:   Fall Risk Interventions:     Been absent of falls. Medication Interventions: Teach patient to arise slowly    Problem: Crack/Cocaine Withdrawal  Goal: *STG: Remains safe in hospital  Description  Remains safe daily while in hospital.   Outcome: Progressing Towards Goal  Note:   Have not engage in self harming behaviors. Problem: Crack/Cocaine Withdrawal  Goal: *STG: Complies with medication therapy  Description  Complies with medication therapy daily while in hospital.   Outcome: Progressing Towards Goal  Note:   Been medication compliant. Pt been out in the milieu,some walking back and forth. He approach staff asking if he can leave, stated he came voluntary. Stated he have some legal issues that he needs to take care of. Stated he mainly came to the hospital to get back on his medications,he been off them for awhile. He denied ideations. Stated endorsing voices but not command. Stated the voices are always there. He was pleasant on approach. Been medication compliant. Been no behavior issues so far this shift. He's hoping he can leave today. This writer will let the attending know, the pt is asking to be discharge.

## 2019-08-09 NOTE — BH NOTES
Discharge instructions explained, pt given a copy.  Discharged at 200 with his belongings, given a bus pass per request.
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
## 2019-09-02 ENCOUNTER — HOSPITAL ENCOUNTER (EMERGENCY)
Age: 49
Discharge: LEFT BEFORE DISCHARGE | End: 2019-09-03
Attending: EMERGENCY MEDICINE
Payer: MEDICAID

## 2019-09-02 VITALS
SYSTOLIC BLOOD PRESSURE: 119 MMHG | RESPIRATION RATE: 18 BRPM | TEMPERATURE: 98.8 F | DIASTOLIC BLOOD PRESSURE: 69 MMHG | WEIGHT: 140 LBS | BODY MASS INDEX: 18.47 KG/M2 | OXYGEN SATURATION: 98 % | HEART RATE: 90 BPM

## 2019-09-02 DIAGNOSIS — Z53.20 LEFT BEFORE TREATMENT COMPLETED: Primary | ICD-10-CM

## 2019-09-02 LAB
APPEARANCE UR: CLEAR
BASOPHILS # BLD: 0 K/UL (ref 0–0.1)
BASOPHILS NFR BLD: 1 % (ref 0–2)
BILIRUB UR QL: NEGATIVE
COLOR UR: YELLOW
DIFFERENTIAL METHOD BLD: ABNORMAL
EOSINOPHIL # BLD: 0.3 K/UL (ref 0–0.4)
EOSINOPHIL NFR BLD: 4 % (ref 0–5)
ERYTHROCYTE [DISTWIDTH] IN BLOOD BY AUTOMATED COUNT: 12.9 % (ref 11.6–14.5)
GLUCOSE UR STRIP.AUTO-MCNC: NEGATIVE MG/DL
HCT VFR BLD AUTO: 36.4 % (ref 36–48)
HGB BLD-MCNC: 13.1 G/DL (ref 13–16)
HGB UR QL STRIP: NEGATIVE
KETONES UR QL STRIP.AUTO: NEGATIVE MG/DL
LEUKOCYTE ESTERASE UR QL STRIP.AUTO: NEGATIVE
LYMPHOCYTES # BLD: 1.8 K/UL (ref 0.9–3.6)
LYMPHOCYTES NFR BLD: 26 % (ref 21–52)
MCH RBC QN AUTO: 33.7 PG (ref 24–34)
MCHC RBC AUTO-ENTMCNC: 36 G/DL (ref 31–37)
MCV RBC AUTO: 93.6 FL (ref 74–97)
MONOCYTES # BLD: 0.7 K/UL (ref 0.05–1.2)
MONOCYTES NFR BLD: 10 % (ref 3–10)
NEUTS SEG # BLD: 4.3 K/UL (ref 1.8–8)
NEUTS SEG NFR BLD: 59 % (ref 40–73)
NITRITE UR QL STRIP.AUTO: NEGATIVE
PH UR STRIP: 5.5 [PH] (ref 5–8)
PLATELET # BLD AUTO: 146 K/UL (ref 135–420)
PMV BLD AUTO: 8.8 FL (ref 9.2–11.8)
PROT UR STRIP-MCNC: NEGATIVE MG/DL
RBC # BLD AUTO: 3.89 M/UL (ref 4.7–5.5)
SP GR UR REFRACTOMETRY: 1.02 (ref 1–1.03)
UROBILINOGEN UR QL STRIP.AUTO: 1 EU/DL (ref 0.2–1)
WBC # BLD AUTO: 7.2 K/UL (ref 4.6–13.2)

## 2019-09-02 PROCEDURE — 75810000275 HC EMERGENCY DEPT VISIT NO LEVEL OF CARE

## 2019-09-02 PROCEDURE — 85025 COMPLETE CBC W/AUTO DIFF WBC: CPT

## 2019-09-02 PROCEDURE — 81003 URINALYSIS AUTO W/O SCOPE: CPT

## 2019-09-02 PROCEDURE — 80053 COMPREHEN METABOLIC PANEL: CPT

## 2019-09-02 PROCEDURE — 80307 DRUG TEST PRSMV CHEM ANLYZR: CPT

## 2019-09-02 RX ORDER — RANITIDINE 150 MG/1
150 TABLET, FILM COATED ORAL DAILY
COMMUNITY

## 2019-09-02 RX ORDER — HALOPERIDOL 20 MG/1
20 TABLET ORAL
COMMUNITY

## 2019-09-02 RX ORDER — HYDROXYZINE 50 MG/1
100 TABLET, FILM COATED ORAL DAILY
COMMUNITY

## 2019-09-02 RX ORDER — LITHIUM CARBONATE 300 MG/1
600 CAPSULE ORAL
COMMUNITY

## 2019-09-02 RX ORDER — BENZTROPINE MESYLATE 1 MG/1
1 TABLET ORAL DAILY
COMMUNITY

## 2019-09-03 ENCOUNTER — HOSPITAL ENCOUNTER (EMERGENCY)
Age: 49
Discharge: PSYCHIATRIC OR SUBSTANCE USE FACILITY | End: 2019-09-04
Attending: EMERGENCY MEDICINE
Payer: MEDICAID

## 2019-09-03 DIAGNOSIS — F19.10 POLYSUBSTANCE ABUSE (HCC): Primary | ICD-10-CM

## 2019-09-03 DIAGNOSIS — Z72.0 TOBACCO USER: ICD-10-CM

## 2019-09-03 LAB
ALBUMIN SERPL-MCNC: 3 G/DL (ref 3.4–5)
ALBUMIN SERPL-MCNC: 3.2 G/DL (ref 3.4–5)
ALBUMIN/GLOB SERPL: 1.3 {RATIO} (ref 0.8–1.7)
ALBUMIN/GLOB SERPL: 1.3 {RATIO} (ref 0.8–1.7)
ALP SERPL-CCNC: 84 U/L (ref 45–117)
ALP SERPL-CCNC: 98 U/L (ref 45–117)
ALT SERPL-CCNC: 24 U/L (ref 16–61)
ALT SERPL-CCNC: 24 U/L (ref 16–61)
AMPHET UR QL SCN: NEGATIVE
AMPHET UR QL SCN: NEGATIVE
ANION GAP SERPL CALC-SCNC: 4 MMOL/L (ref 3–18)
ANION GAP SERPL CALC-SCNC: 6 MMOL/L (ref 3–18)
APPEARANCE UR: ABNORMAL
AST SERPL-CCNC: 22 U/L (ref 10–38)
AST SERPL-CCNC: 22 U/L (ref 10–38)
BARBITURATES UR QL SCN: NEGATIVE
BARBITURATES UR QL SCN: NEGATIVE
BASOPHILS # BLD: 0 K/UL (ref 0–0.1)
BASOPHILS NFR BLD: 0 % (ref 0–2)
BENZODIAZ UR QL: NEGATIVE
BENZODIAZ UR QL: NEGATIVE
BILIRUB SERPL-MCNC: 0.4 MG/DL (ref 0.2–1)
BILIRUB SERPL-MCNC: 0.5 MG/DL (ref 0.2–1)
BILIRUB UR QL: NEGATIVE
BUN SERPL-MCNC: 10 MG/DL (ref 7–18)
BUN SERPL-MCNC: 13 MG/DL (ref 7–18)
BUN/CREAT SERPL: 12 (ref 12–20)
BUN/CREAT SERPL: 14 (ref 12–20)
CALCIUM SERPL-MCNC: 8.1 MG/DL (ref 8.5–10.1)
CALCIUM SERPL-MCNC: 8.3 MG/DL (ref 8.5–10.1)
CANNABINOIDS UR QL SCN: NEGATIVE
CANNABINOIDS UR QL SCN: NEGATIVE
CHLORIDE SERPL-SCNC: 108 MMOL/L (ref 100–111)
CHLORIDE SERPL-SCNC: 112 MMOL/L (ref 100–111)
CO2 SERPL-SCNC: 27 MMOL/L (ref 21–32)
CO2 SERPL-SCNC: 30 MMOL/L (ref 21–32)
COCAINE UR QL SCN: POSITIVE
COCAINE UR QL SCN: POSITIVE
COLOR UR: YELLOW
CREAT SERPL-MCNC: 0.86 MG/DL (ref 0.6–1.3)
CREAT SERPL-MCNC: 0.96 MG/DL (ref 0.6–1.3)
DIFFERENTIAL METHOD BLD: ABNORMAL
EOSINOPHIL # BLD: 0.3 K/UL (ref 0–0.4)
EOSINOPHIL NFR BLD: 4 % (ref 0–5)
ERYTHROCYTE [DISTWIDTH] IN BLOOD BY AUTOMATED COUNT: 13 % (ref 11.6–14.5)
ETHANOL SERPL-MCNC: 3 MG/DL (ref 0–3)
ETHANOL SERPL-MCNC: <3 MG/DL (ref 0–3)
GLOBULIN SER CALC-MCNC: 2.4 G/DL (ref 2–4)
GLOBULIN SER CALC-MCNC: 2.5 G/DL (ref 2–4)
GLUCOSE SERPL-MCNC: 126 MG/DL (ref 74–99)
GLUCOSE SERPL-MCNC: 92 MG/DL (ref 74–99)
GLUCOSE UR STRIP.AUTO-MCNC: NEGATIVE MG/DL
HCT VFR BLD AUTO: 38.1 % (ref 36–48)
HDSCOM,HDSCOM: ABNORMAL
HDSCOM,HDSCOM: ABNORMAL
HGB BLD-MCNC: 13.4 G/DL (ref 13–16)
HGB UR QL STRIP: NEGATIVE
KETONES UR QL STRIP.AUTO: NEGATIVE MG/DL
LEUKOCYTE ESTERASE UR QL STRIP.AUTO: NEGATIVE
LYMPHOCYTES # BLD: 1.6 K/UL (ref 0.9–3.6)
LYMPHOCYTES NFR BLD: 21 % (ref 21–52)
MCH RBC QN AUTO: 33.3 PG (ref 24–34)
MCHC RBC AUTO-ENTMCNC: 35.2 G/DL (ref 31–37)
MCV RBC AUTO: 94.5 FL (ref 74–97)
METHADONE UR QL: NEGATIVE
METHADONE UR QL: NEGATIVE
MONOCYTES # BLD: 0.7 K/UL (ref 0.05–1.2)
MONOCYTES NFR BLD: 9 % (ref 3–10)
NEUTS SEG # BLD: 5 K/UL (ref 1.8–8)
NEUTS SEG NFR BLD: 66 % (ref 40–73)
NITRITE UR QL STRIP.AUTO: NEGATIVE
OPIATES UR QL: NEGATIVE
OPIATES UR QL: NEGATIVE
PCP UR QL: NEGATIVE
PCP UR QL: NEGATIVE
PH UR STRIP: 7 [PH] (ref 5–8)
PLATELET # BLD AUTO: 170 K/UL (ref 135–420)
PMV BLD AUTO: 9.1 FL (ref 9.2–11.8)
POTASSIUM SERPL-SCNC: 3.7 MMOL/L (ref 3.5–5.5)
POTASSIUM SERPL-SCNC: 4.1 MMOL/L (ref 3.5–5.5)
PROT SERPL-MCNC: 5.4 G/DL (ref 6.4–8.2)
PROT SERPL-MCNC: 5.7 G/DL (ref 6.4–8.2)
PROT UR STRIP-MCNC: NEGATIVE MG/DL
RBC # BLD AUTO: 4.03 M/UL (ref 4.7–5.5)
SODIUM SERPL-SCNC: 142 MMOL/L (ref 136–145)
SODIUM SERPL-SCNC: 145 MMOL/L (ref 136–145)
SP GR UR REFRACTOMETRY: 1.01 (ref 1–1.03)
UROBILINOGEN UR QL STRIP.AUTO: 2 EU/DL (ref 0.2–1)
WBC # BLD AUTO: 7.6 K/UL (ref 4.6–13.2)

## 2019-09-03 PROCEDURE — 80053 COMPREHEN METABOLIC PANEL: CPT

## 2019-09-03 PROCEDURE — 74011250637 HC RX REV CODE- 250/637: Performed by: EMERGENCY MEDICINE

## 2019-09-03 PROCEDURE — 85025 COMPLETE CBC W/AUTO DIFF WBC: CPT

## 2019-09-03 PROCEDURE — 80307 DRUG TEST PRSMV CHEM ANLYZR: CPT

## 2019-09-03 PROCEDURE — 99285 EMERGENCY DEPT VISIT HI MDM: CPT

## 2019-09-03 PROCEDURE — 81003 URINALYSIS AUTO W/O SCOPE: CPT

## 2019-09-03 RX ORDER — IBUPROFEN 200 MG
1 TABLET ORAL DAILY
Status: DISCONTINUED | OUTPATIENT
Start: 2019-09-03 | End: 2019-09-05 | Stop reason: HOSPADM

## 2019-09-03 RX ORDER — IBUPROFEN 200 MG
1 TABLET ORAL DAILY
Status: DISCONTINUED | OUTPATIENT
Start: 2019-09-04 | End: 2019-09-03
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
## 2019-09-03 NOTE — BSMART NOTE
Comprehensive Assessment Form Part 1    Disposition    Discussed with MICHELE Tafoya and the patient, plan is that patient would like [address] for help with alcohol detox and crack-cocaine addiction; spoke with a[facility]tti [facility] [address] [facility] [address], [phone], who informed this writer that there are no available beds at this time; however, discharges are expected on tomorrow; clinicals submitted for review. Integrated Summary    Patient is a 47year old male who presented to the emergency room requesting help with alcohol detox and crack-monster addiction. Patient verbalized that he was clean for 5 years, 4 month, and 16 days; however, patient said that his  and for the last 4-5 months, I've been using really heavy. Patient said that he drinks 1/2-1 gal of liquor daily and smokes about $200.00-$300.00 worth of crack-monster/day. Patient added that he last used crack last night and last drink of liquor was 12-14 hours, ago. Patient is alert and oriented x 4, calm, cooperative, \"depressed\" mood, affect is congruent with mood, dressed in hospital scrubs, denied appetite or sleep disturbances. Patient stated that he is not suicidal, nor homicidal. Patient stated that he experiences auditory hallucinations \"hear voices all the time,\" but patient denied command auditory hallucinations. Inpatient: [facility],   Outpatient: [facility], but \"did not keep appointment. \"  Legal Issues: \"Probation for grand laryeimi and I violated my probation, after I got caught with a crack pipe. \"  Access to weapons: Denied access to weapons. Contact/Support Person: Eri Fong, [employer] [address] [place] [address], [phone], . \" Patient said that he lives in [facility] housing and shares a house with another male client. Patient also verbalized that he is a registered sex offender \"situation that happened when I was 24years old. \"    Substance Abuse    The patient is using substances.   The patient is using alcohol for greater than 10 years with last use on \"12-14 hrs\" from this crisis interview, and cocaine by inhalation for greater that 10 years with last use on \"9/02/19. \" The patient has experienced the following withdrawal symptoms, anxiety, tremors, and sweats.     Jesus Davalos, RN, BSN
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
## 2019-09-03 NOTE — ED NOTES
Pt states he last drank around 2am, and last used crack cocaine around 9pm last night. Pt states he is not SI or HI. He is just here for detox. Belongings and clothing placed in locker [address] [place] [address]. PT has his phone and  with him, states he has to check in with  several times a day \"or else they'll send someone to arrest me. \"

## 2019-09-04 VITALS
RESPIRATION RATE: 98 BRPM | SYSTOLIC BLOOD PRESSURE: 112 MMHG | WEIGHT: 140 LBS | OXYGEN SATURATION: 98 % | TEMPERATURE: 98 F | HEIGHT: 74 IN | BODY MASS INDEX: 17.97 KG/M2 | HEART RATE: 79 BPM | DIASTOLIC BLOOD PRESSURE: 63 MMHG

## 2019-09-04 NOTE — BSMART NOTE
Contacted [facility] to follow up on patients referral for inpatient treatment. Admission's did not see chart for review. ER chart again faxed for review.

## 2019-09-04 NOTE — ED NOTES
7 AM patient turned over to me Dr Marilyn Law. He is a 28-year-old gentleman who is here with suicidal ideation and drug abuse. He is currently pending placement. He has no new complaints. General; AOX3. Pulmonary; CTA-B. Cardiac: RRR no MRG; Abd S/NT/ND.  Plan:  Place

## 2019-09-04 NOTE — BSMART NOTE
Per [facility]:  Accepted by Dr. Giancarlo Chappell  Unit: CER  Report #: [id number]
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
## 2019-09-04 NOTE — BSMART NOTE
Catalino Leroy called to speak to patient. Gave Clementina the ER's number. Called the ER and gave them the heads up to expect Eugenio Zarate to call to speak with patient.

## 2019-09-04 NOTE — ED NOTES
TRANSFER - OUT REPORT:    Verbal report given to Santana Ballesteros RN on [facility]  being transferred to [facility] for routine progression of care       Report consisted of patients Situation, Background, Assessment and   Recommendations(SBAR). Information from the following report(s) SBAR, ED Summary and MAR was reviewed with the receiving nurse. Lines: N/A      Opportunity for questions and clarification was provided.       Patient transported with: Pt medications from home, belongings

## 2019-09-04 NOTE — ED NOTES
Dual verification by Blane Fitzgerald, RN and Sapna Wells, RN for medical staff of [facility] ER to speak with pt's  Connie Crowley\" about pt's medical status.

## 2022-03-22 ENCOUNTER — HOSPITAL ENCOUNTER (EMERGENCY)
Age: 52
Discharge: STILL ADMITTED OR ADMINISTRATIVE | End: 2022-03-22

## 2022-03-22 VITALS
SYSTOLIC BLOOD PRESSURE: 117 MMHG | OXYGEN SATURATION: 98 % | HEART RATE: 96 BPM | RESPIRATION RATE: 18 BRPM | DIASTOLIC BLOOD PRESSURE: 75 MMHG

## 2022-03-22 NOTE — ED NOTES
Patient is requesting a refill of his trazodone and Haldol. He has been off his medications for 1 month and has an appointment with his psychiatrist in 5 days to refill them. He states he is not suicidal or homicidal.  I informed him that I would not be able to refill those medications as he has been off of them for a month. I have also offered for him to see crisis, which he is refusing. Patient is refusing to be seen here today, states he would not like to wait and have his vitals checked or to have a visit. I encouraged him to come back should he change his mind or develop any worsening.     MICHELE Rojo 4:17 PM

## 2022-04-28 NOTE — ED PROVIDER NOTES
EMERGENCY DEPARTMENT HISTORY AND PHYSICAL EXAM  This was created with voice recognition software and transcription errors may be present. 12:17 PM  Date: 8/7/2019  Patient Name: Willy Taylor    History of Presenting Illness     Chief Complaint:    History Provided By:     HPI: Willy Taylor is a 47 y.o. male patient seen by me as well. History of bipolar cocaine use schizophrenia who presents for not sleeping. Patient denied suicidal or homicidal ideation to me per triage notes are appreciated he states he is tired needs somewhere to sleep because his been up for days and been off his meds. No medical complaints at this time    PCP: Amada Dominguez MD      Past History     Past Medical History:  Past Medical History:   Diagnosis Date    ADHD     Bipolar affective ([facility] [address]     Cocaine use 05/2018    uds positive    Schizophrenia ([facility] [address]     Smoker        Past Surgical History:  No past surgical history on file. Family History:  Family History   Adopted: Yes       Social History:  Social History     Tobacco Use    Smoking status: Current Every Day Smoker     Packs/day: 1.00    Smokeless tobacco: Never Used   Substance Use Topics    Alcohol use: No    Drug use: No       Allergies:  No Known Allergies    Review of Systems     Review of Systems   All other systems reviewed and are negative. 10 point review of systems otherwise negative unless noted in HPI. Physical Exam       Physical Exam   Constitutional: He is oriented to person, place, and time. He appears well-developed. HENT:   Head: Normocephalic and atraumatic. Eyes: Pupils are equal, round, and reactive to light. EOM are normal.   Neck: Normal range of motion. Neck supple. Cardiovascular: Normal rate, regular rhythm and normal heart sounds. Exam reveals no friction rub. No murmur heard. Pulmonary/Chest: Effort normal and breath sounds normal. No respiratory distress. He has no wheezes. Abdominal: Soft.  He exhibits no distension. There is no tenderness. There is no rebound and no guarding. Musculoskeletal: Normal range of motion. Neurological: He is alert and oriented to person, place, and time. Skin: Skin is warm and dry. Psychiatric:   Call mall but question recent beba       Diagnostic Study Results     Vital Signs  EKG:  Labs:   Imaging:     Medical Decision Making     ED Course: Progress Notes, Reevaluation, and Consults:      Provider Notes (Medical Decision Making): 58-year-old male presents for not sleeping for 4 days off his meds not suicidal will have crisis evaluate. Diagnosis     Clinical Impression: No diagnosis found. Disposition:    Patient's Medications   Start Taking    No medications on file   Continue Taking    BENZTROPINE (COGENTIN) 1 MG TABLET    Take 1 Tab by mouth every evening. CALCIUM POLYCARBOPHIL (FIBER-LAX) 625 MG TABLET    Take 625 mg by mouth two (2) times a day. HALOPERIDOL (HALDOL) 20 MG TABLET    Take 1 Tab by mouth nightly. HYDROXYZINE HCL (ATARAX) 50 MG TABLET    Take 2 Tabs by mouth every evening. LITHIUM CARBONATE 300 MG CAPSULE    Take 2 Caps by mouth every evening. OLANZAPINE (ZYPREXA) 10 MG TABLET    Take 1 Tab by mouth nightly. OLANZAPINE (ZYPREXA) 10 MG TABLET    Take 1 Tab by mouth nightly. RANITIDINE (ZANTAC) 150 MG TABLET    Take 150 mg by mouth daily.    These Medications have changed    No medications on file   Stop Taking    No medications on file Oxybutynin Pregnancy And Lactation Text: This medication is Pregnancy Category B and is considered safe during pregnancy. It is unknown if it is excreted in breast milk. Minoxidil Pregnancy And Lactation Text: This medication has not been assigned a Pregnancy Risk Category but animal studies failed to show danger with the topical medication. It is unknown if the medication is excreted in breast milk. Eucrisa Counseling: Patient may experience a mild burning sensation during topical application. Eucrisa is not approved in children less than 2 years of age. Griseofulvin Counseling:  I discussed with the patient the risks of griseofulvin including but not limited to photosensitivity, cytopenia, liver damage, nausea/vomiting and severe allergy.  The patient understands that this medication is best absorbed when taken with a fatty meal (e.g., ice cream or french fries). Nsaids Counseling: NSAID Counseling: I discussed with the patient that NSAIDs should be taken with food. Prolonged use of NSAIDs can result in the development of stomach ulcers.  Patient advised to stop taking NSAIDs if abdominal pain occurs.  The patient verbalized understanding of the proper use and possible adverse effects of NSAIDs.  All of the patient's questions and concerns were addressed. Ivermectin Pregnancy And Lactation Text: This medication is Pregnancy Category C and it isn't known if it is safe during pregnancy. It is also excreted in breast milk. Gabapentin Pregnancy And Lactation Text: This medication is Pregnancy Category C and isn't considered safe during pregnancy. It is excreted in breast milk. Infliximab Counseling:  I discussed with the patient the risks of infliximab including but not limited to myelosuppression, immunosuppression, autoimmune hepatitis, demyelinating diseases, lymphoma, and serious infections.  The patient understands that monitoring is required including a PPD at baseline and must alert us or the primary physician if symptoms of infection or other concerning signs are noted. Cyclosporine Pregnancy And Lactation Text: This medication is Pregnancy Category C and it isn't know if it is safe during pregnancy. This medication is excreted in breast milk. Zyclara Pregnancy And Lactation Text: This medication is Pregnancy Category C. It is unknown if this medication is excreted in breast milk. Carac Pregnancy And Lactation Text: This medication is Pregnancy Category X and contraindicated in pregnancy and in women who may become pregnant. It is unknown if this medication is excreted in breast milk. Cimzia Pregnancy And Lactation Text: This medication crosses the placenta but can be considered safe in certain situations. Cimzia may be excreted in breast milk. Cimetidine Counseling:  I discussed with the patient the risks of Cimetidine including but not limited to gynecomastia, headache, diarrhea, nausea, drowsiness, arrhythmias, pancreatitis, skin rashes, psychosis, bone marrow suppression and kidney toxicity. Prednisone Counseling:  I discussed with the patient the risks of prolonged use of prednisone including but not limited to weight gain, insomnia, osteoporosis, mood changes, diabetes, susceptibility to infection, glaucoma and high blood pressure.  In cases where prednisone use is prolonged, patients should be monitored with blood pressure checks, serum glucose levels and an eye exam.  Additionally, the patient may need to be placed on GI prophylaxis, PCP prophylaxis, and calcium and vitamin D supplementation and/or a bisphosphonate.  The patient verbalized understanding of the proper use and the possible adverse effects of prednisone.  All of the patient's questions and concerns were addressed. Skyrizi Counseling: I discussed with the patient the risks of risankizumab-rzaa including but not limited to immunosuppression, and serious infections.  The patient understands that monitoring is required including a PPD at baseline and must alert us or the primary physician if symptoms of infection or other concerning signs are noted. Birth Control Pills Pregnancy And Lactation Text: This medication should be avoided if pregnant and for the first 30 days post-partum. Hydroxychloroquine Counseling:  I discussed with the patient that a baseline ophthalmologic exam is needed at the start of therapy and every year thereafter while on therapy. A CBC may also be warranted for monitoring.  The side effects of this medication were discussed with the patient, including but not limited to agranulocytosis, aplastic anemia, seizures, rashes, retinopathy, and liver toxicity. Patient instructed to call the office should any adverse effect occur.  The patient verbalized understanding of the proper use and possible adverse effects of Plaquenil.  All the patient's questions and concerns were addressed. Humira Pregnancy And Lactation Text: This medication is Pregnancy Category B and is considered safe during pregnancy. It is unknown if this medication is excreted in breast milk. Azithromycin Pregnancy And Lactation Text: This medication is considered safe during pregnancy and is also secreted in breast milk. Topical Clindamycin Counseling: Patient counseled that this medication may cause skin irritation or allergic reactions.  In the event of skin irritation, the patient was advised to reduce the amount of the drug applied or use it less frequently.   The patient verbalized understanding of the proper use and possible adverse effects of clindamycin.  All of the patient's questions and concerns were addressed. Valtrex Pregnancy And Lactation Text: this medication is Pregnancy Category B and is considered safe during pregnancy. This medication is not directly found in breast milk but it's metabolite acyclovir is present. SSKI Counseling:  I discussed with the patient the risks of SSKI including but not limited to thyroid abnormalities, metallic taste, GI upset, fever, headache, acne, arthralgias, paraesthesias, lymphadenopathy, easy bleeding, arrhythmias, and allergic reaction. Clindamycin Pregnancy And Lactation Text: This medication can be used in pregnancy if certain situations. Clindamycin is also present in breast milk. Otezla Counseling: The side effects of Otezla were discussed with the patient, including but not limited to worsening or new depression, weight loss, diarrhea, nausea, upper respiratory tract infection, and headache. Patient instructed to call the office should any adverse effect occur.  The patient verbalized understanding of the proper use and possible adverse effects of Otezla.  All the patient's questions and concerns were addressed. Protopic Pregnancy And Lactation Text: This medication is Pregnancy Category C. It is unknown if this medication is excreted in breast milk when applied topically. Azathioprine Pregnancy And Lactation Text: This medication is Pregnancy Category D and isn't considered safe during pregnancy. It is unknown if this medication is excreted in breast milk. Drysol Counseling:  I discussed with the patient the risks of drysol/aluminum chloride including but not limited to skin rash, itching, irritation, burning. Dapsone Counseling: I discussed with the patient the risks of dapsone including but not limited to hemolytic anemia, agranulocytosis, rashes, methemoglobinemia, kidney failure, peripheral neuropathy, headaches, GI upset, and liver toxicity.  Patients who start dapsone require monitoring including baseline LFTs and weekly CBCs for the first month, then every month thereafter.  The patient verbalized understanding of the proper use and possible adverse effects of dapsone.  All of the patient's questions and concerns were addressed. Sski Pregnancy And Lactation Text: This medication is Pregnancy Category D and isn't considered safe during pregnancy. It is excreted in breast milk. Sarecycline Counseling: Patient advised regarding possible photosensitivity and discoloration of the teeth, skin, lips, tongue and gums.  Patient instructed to avoid sunlight, if possible.  When exposed to sunlight, patients should wear protective clothing, sunglasses, and sunscreen.  The patient was instructed to call the office immediately if the following severe adverse effects occur:  hearing changes, easy bruising/bleeding, severe headache, or vision changes.  The patient verbalized understanding of the proper use and possible adverse effects of sarecycline.  All of the patient's questions and concerns were addressed. Odomzo Pregnancy And Lactation Text: This medication is Pregnancy Category X and is absolutely contraindicated during pregnancy. It is unknown if it is excreted in breast milk. High Dose Vitamin A Pregnancy And Lactation Text: High dose vitamin A therapy is contraindicated during pregnancy and breast feeding. Metronidazole Counseling:  I discussed with the patient the risks of metronidazole including but not limited to seizures, nausea/vomiting, a metallic taste in the mouth, nausea/vomiting and severe allergy. Cephalexin Pregnancy And Lactation Text: This medication is Pregnancy Category B and considered safe during pregnancy.  It is also excreted in breast milk but can be used safely for shorter doses. Doxepin Counseling:  Patient advised that the medication is sedating and not to drive a car after taking this medication. Patient informed of potential adverse effects including but not limited to dry mouth, urinary retention, and blurry vision.  The patient verbalized understanding of the proper use and possible adverse effects of doxepin.  All of the patient's questions and concerns were addressed. Enbrel Counseling:  I discussed with the patient the risks of etanercept including but not limited to myelosuppression, immunosuppression, autoimmune hepatitis, demyelinating diseases, lymphoma, and infections.  The patient understands that monitoring is required including a PPD at baseline and must alert us or the primary physician if symptoms of infection or other concerning signs are noted. Doxycycline Counseling:  Patient counseled regarding possible photosensitivity and increased risk for sunburn.  Patient instructed to avoid sunlight, if possible.  When exposed to sunlight, patients should wear protective clothing, sunglasses, and sunscreen.  The patient was instructed to call the office immediately if the following severe adverse effects occur:  hearing changes, easy bruising/bleeding, severe headache, or vision changes.  The patient verbalized understanding of the proper use and possible adverse effects of doxycycline.  All of the patient's questions and concerns were addressed. Erythromycin Counseling:  I discussed with the patient the risks of erythromycin including but not limited to GI upset, allergic reaction, drug rash, diarrhea, increase in liver enzymes, and yeast infections. Gabapentin Counseling: I discussed with the patient the risks of gabapentin including but not limited to dizziness, somnolence, fatigue and ataxia. Picato Counseling:  I discussed with the patient the risks of Picato including but not limited to erythema, scaling, itching, weeping, crusting, and pain. Ivermectin Counseling:  Patient instructed to take medication on an empty stomach with a full glass of water.  Patient informed of potential adverse effects including but not limited to nausea, diarrhea, dizziness, itching, and swelling of the extremities or lymph nodes.  The patient verbalized understanding of the proper use and possible adverse effects of ivermectin.  All of the patient's questions and concerns were addressed. Zyclara Counseling:  I discussed with the patient the risks of imiquimod including but not limited to erythema, scaling, itching, weeping, crusting, and pain.  Patient understands that the inflammatory response to imiquimod is variable from person to person and was educated regarded proper titration schedule.  If flu-like symptoms develop, patient knows to discontinue the medication and contact us. Quinolones Counseling:  I discussed with the patient the risks of fluoroquinolones including but not limited to GI upset, allergic reaction, drug rash, diarrhea, dizziness, photosensitivity, yeast infections, liver function test abnormalities, tendonitis/tendon rupture. Otezla Pregnancy And Lactation Text: This medication is Pregnancy Category C and it isn't known if it is safe during pregnancy. It is unknown if it is excreted in breast milk. Tazorac Pregnancy And Lactation Text: This medication is not safe during pregnancy. It is unknown if this medication is excreted in breast milk. Bactrim Counseling:  I discussed with the patient the risks of sulfa antibiotics including but not limited to GI upset, allergic reaction, drug rash, diarrhea, dizziness, photosensitivity, and yeast infections.  Rarely, more serious reactions can occur including but not limited to aplastic anemia, agranulocytosis, methemoglobinemia, blood dyscrasias, liver or kidney failure, lung infiltrates or desquamative/blistering drug rashes. Glycopyrrolate Counseling:  I discussed with the patient the risks of glycopyrrolate including but not limited to skin rash, drowsiness, dry mouth, difficulty urinating, and blurred vision. Taltz Pregnancy And Lactation Text: The risk during pregnancy and breastfeeding is uncertain with this medication. Sarecycline Pregnancy And Lactation Text: This medication is Pregnancy Category D and not consider safe during pregnancy. It is also excreted in breast milk. Ketoconazole Pregnancy And Lactation Text: This medication is Pregnancy Category C and it isn't know if it is safe during pregnancy. It is also excreted in breast milk and breast feeding isn't recommended. Carac Counseling:  I discussed with the patient the risks of Carac including but not limited to erythema, scaling, itching, weeping, crusting, and pain. Drysol Pregnancy And Lactation Text: This medication is considered safe during pregnancy and breast feeding. Doxycycline Pregnancy And Lactation Text: This medication is Pregnancy Category D and not consider safe during pregnancy. It is also excreted in breast milk but is considered safe for shorter treatment courses. Xolair Counseling:  Patient informed of potential adverse effects including but not limited to fever, muscle aches, rash and allergic reactions.  The patient verbalized understanding of the proper use and possible adverse effects of Xolair.  All of the patient's questions and concerns were addressed. Birth Control Pills Counseling: Birth Control Pill Counseling: I discussed with the patient the potential side effects of OCPs including but not limited to increased risk of stroke, heart attack, thrombophlebitis, deep venous thrombosis, hepatic adenomas, breast changes, GI upset, headaches, and depression.  The patient verbalized understanding of the proper use and possible adverse effects of OCPs. All of the patient's questions and concerns were addressed. Minoxidil Counseling: Minoxidil is a topical medication which can increase blood flow where it is applied. It is uncertain how this medication increases hair growth. Side effects are uncommon and include stinging and allergic reactions. Erythromycin Pregnancy And Lactation Text: This medication is Pregnancy Category B and is considered safe during pregnancy. It is also excreted in breast milk. Dupixent Pregnancy And Lactation Text: This medication likely crosses the placenta but the risk for the fetus is uncertain. This medication is excreted in breast milk. Doxepin Pregnancy And Lactation Text: This medication is Pregnancy Category C and it isn't known if it is safe during pregnancy. It is also excreted in breast milk and breast feeding isn't recommended. Spironolactone Counseling: Patient advised regarding risks of diarrhea, abdominal pain, hyperkalemia, birth defects (for female patients), liver toxicity and renal toxicity. The patient may need blood work to monitor liver and kidney function and potassium levels while on therapy. The patient verbalized understanding of the proper use and possible adverse effects of spironolactone.  All of the patient's questions and concerns were addressed. Nsaids Pregnancy And Lactation Text: These medications are considered safe up to 30 weeks gestation. It is excreted in breast milk. Hydroxyzine Pregnancy And Lactation Text: This medication is not safe during pregnancy and should not be taken. It is also excreted in breast milk and breast feeding isn't recommended. Odomzo Counseling- I discussed with the patient the risks of Odomzo including but not limited to nausea, vomiting, diarrhea, constipation, weight loss, changes in the sense of taste, decreased appetite, muscle spasms, and hair loss.  The patient verbalized understanding of the proper use and possible adverse effects of Odomzo.  All of the patient's questions and concerns were addressed. Valtrex Counseling: I discussed with the patient the risks of valacyclovir including but not limited to kidney damage, nausea, vomiting and severe allergy.  The patient understands that if the infection seems to be worsening or is not improving, they are to call. Include Pregnancy/Lactation Warning?: No Bexarotene Pregnancy And Lactation Text: This medication is Pregnancy Category X and should not be given to women who are pregnant or may become pregnant. This medication should not be used if you are breast feeding. Itraconazole Counseling:  I discussed with the patient the risks of itraconazole including but not limited to liver damage, nausea/vomiting, neuropathy, and severe allergy.  The patient understands that this medication is best absorbed when taken with acidic beverages such as non-diet cola or ginger ale.  The patient understands that monitoring is required including baseline LFTs and repeat LFTs at intervals.  The patient understands that they are to contact us or the primary physician if concerning signs are noted. Oxybutynin Counseling:  I discussed with the patient the risks of oxybutynin including but not limited to skin rash, drowsiness, dry mouth, difficulty urinating, and blurred vision. Erivedge Counseling- I discussed with the patient the risks of Erivedge including but not limited to nausea, vomiting, diarrhea, constipation, weight loss, changes in the sense of taste, decreased appetite, muscle spasms, and hair loss.  The patient verbalized understanding of the proper use and possible adverse effects of Erivedge.  All of the patient's questions and concerns were addressed. Xelbeckiez Pregnancy And Lactation Text: This medication is Pregnancy Category D and is not considered safe during pregnancy.  The risk during breast feeding is also uncertain. Humira Counseling:  I discussed with the patient the risks of adalimumab including but not limited to myelosuppression, immunosuppression, autoimmune hepatitis, demyelinating diseases, lymphoma, and serious infections.  The patient understands that monitoring is required including a PPD at baseline and must alert us or the primary physician if symptoms of infection or other concerning signs are noted. Fluconazole Counseling:  Patient counseled regarding adverse effects of fluconazole including but not limited to headache, diarrhea, nausea, upset stomach, liver function test abnormalities, taste disturbance, and stomach pain.  There is a rare possibility of liver failure that can occur when taking fluconazole.  The patient understands that monitoring of LFTs and kidney function test may be required, especially at baseline. The patient verbalized understanding of the proper use and possible adverse effects of fluconazole.  All of the patient's questions and concerns were addressed. Simponi Counseling:  I discussed with the patient the risks of golimumab including but not limited to myelosuppression, immunosuppression, autoimmune hepatitis, demyelinating diseases, lymphoma, and serious infections.  The patient understands that monitoring is required including a PPD at baseline and must alert us or the primary physician if symptoms of infection or other concerning signs are noted. Azithromycin Counseling:  I discussed with the patient the risks of azithromycin including but not limited to GI upset, allergic reaction, drug rash, diarrhea, and yeast infections. Dapsone Pregnancy And Lactation Text: This medication is Pregnancy Category C and is not considered safe during pregnancy or breast feeding. Rifampin Pregnancy And Lactation Text: This medication is Pregnancy Category C and it isn't know if it is safe during pregnancy. It is also excreted in breast milk and should not be used if you are breast feeding. Isotretinoin Counseling: Patient should get monthly blood tests, not donate blood, not drive at night if vision affected, not share medication, and not undergo elective surgery for 6 months after tx completed. Side effects reviewed, pt to contact office should one occur. Tremfya Counseling: I discussed with the patient the risks of guselkumab including but not limited to immunosuppression, serious infections, and drug reactions.  The patient understands that monitoring is required including a PPD at baseline and must alert us or the primary physician if symptoms of infection or other concerning signs are noted. High Dose Vitamin A Counseling: Side effects reviewed, pt to contact office should one occur. Metronidazole Pregnancy And Lactation Text: This medication is Pregnancy Category B and considered safe during pregnancy.  It is also excreted in breast milk. Itraconazole Pregnancy And Lactation Text: This medication is Pregnancy Category C and it isn't know if it is safe during pregnancy. It is also excreted in breast milk. Cimzia Counseling:  I discussed with the patient the risks of Cimzia including but not limited to immunosuppression, allergic reactions and infections.  The patient understands that monitoring is required including a PPD at baseline and must alert us or the primary physician if symptoms of infection or other concerning signs are noted. Glycopyrrolate Pregnancy And Lactation Text: This medication is Pregnancy Category B and is considered safe during pregnancy. It is unknown if it is excreted breast milk. Bactrim Pregnancy And Lactation Text: This medication is Pregnancy Category D and is known to cause fetal risk.  It is also excreted in breast milk. Protopic Counseling: Patient may experience a mild burning sensation during topical application. Protopic is not approved in children less than 2 years of age. There have been case reports of hematologic and skin malignancies in patients using topical calcineurin inhibitors although causality is questionable. Terbinafine Counseling: Patient counseling regarding adverse effects of terbinafine including but not limited to headache, diarrhea, rash, upset stomach, liver function test abnormalities, itching, taste/smell disturbance, nausea, abdominal pain, and flatulence.  There is a rare possibility of liver failure that can occur when taking terbinafine.  The patient understands that a baseline LFT and kidney function test may be required. The patient verbalized understanding of the proper use and possible adverse effects of terbinafine.  All of the patient's questions and concerns were addressed. Topical Sulfur Applications Counseling: Topical Sulfur Counseling: Patient counseled that this medication may cause skin irritation or allergic reactions.  In the event of skin irritation, the patient was advised to reduce the amount of the drug applied or use it less frequently.   The patient verbalized understanding of the proper use and possible adverse effects of topical sulfur application.  All of the patient's questions and concerns were addressed. Acitretin Pregnancy And Lactation Text: This medication is Pregnancy Category X and should not be given to women who are pregnant or may become pregnant in the future. This medication is excreted in breast milk. Bexarotene Counseling:  I discussed with the patient the risks of bexarotene including but not limited to hair loss, dry lips/skin/eyes, liver abnormalities, hyperlipidemia, pancreatitis, depression/suicidal ideation, photosensitivity, drug rash/allergic reactions, hypothyroidism, anemia, leukopenia, infection, cataracts, and teratogenicity.  Patient understands that they will need regular blood tests to check lipid profile, liver function tests, white blood cell count, thyroid function tests and pregnancy test if applicable. Clofazimine Counseling:  I discussed with the patient the risks of clofazimine including but not limited to skin and eye pigmentation, liver damage, nausea/vomiting, gastrointestinal bleeding and allergy. Cosentyx Counseling:  I discussed with the patient the risks of Cosentyx including but not limited to worsening of Crohn's disease, immunosuppression, allergic reactions and infections.  The patient understands that monitoring is required including a PPD at baseline and must alert us or the primary physician if symptoms of infection or other concerning signs are noted. Rituxan Counseling:  I discussed with the patient the risks of Rituxan infusions. Side effects can include infusion reactions, severe drug rashes including mucocutaneous reactions, reactivation of latent hepatitis and other infections and rarely progressive multifocal leukoencephalopathy.  All of the patient's questions and concerns were addressed. Benzoyl Peroxide Counseling: Patient counseled that medicine may cause skin irritation and bleach clothing.  In the event of skin irritation, the patient was advised to reduce the amount of the drug applied or use it less frequently.   The patient verbalized understanding of the proper use and possible adverse effects of benzoyl peroxide.  All of the patient's questions and concerns were addressed. Stelara Counseling:  I discussed with the patient the risks of ustekinumab including but not limited to immunosuppression, malignancy, posterior leukoencephalopathy syndrome, and serious infections.  The patient understands that monitoring is required including a PPD at baseline and must alert us or the primary physician if symptoms of infection or other concerning signs are noted. Solaraze Counseling:  I discussed with the patient the risks of Solaraze including but not limited to erythema, scaling, itching, weeping, crusting, and pain. Dupixent Counseling: I discussed with the patient the risks of dupilumab including but not limited to eye infection and irritation, cold sores, injection site reactions, worsening of asthma, allergic reactions and increased risk of parasitic infection.  Live vaccines should be avoided while taking dupilumab. Dupilumab will also interact with certain medications such as warfarin and cyclosporine. The patient understands that monitoring is required and they must alert us or the primary physician if symptoms of infection or other concerning signs are noted. Arava Counseling:  Patient counseled regarding adverse effects of Arava including but not limited to nausea, vomiting, abnormalities in liver function tests. Patients may develop mouth sores, rash, diarrhea, and abnormalities in blood counts. The patient understands that monitoring is required including LFTs and blood counts.  There is a rare possibility of scarring of the liver and lung problems that can occur when taking methotrexate. Persistent nausea, loss of appetite, pale stools, dark urine, cough, and shortness of breath should be reported immediately. Patient advised to discontinue Arava treatment and consult with a physician prior to attempting conception. The patient will have to undergo a treatment to eliminate Arava from the body prior to conception. Thalidomide Counseling: I discussed with the patient the risks of thalidomide including but not limited to birth defects, anxiety, weakness, chest pain, dizziness, cough and severe allergy. 5-Fu Counseling: 5-Fluorouracil Counseling:  I discussed with the patient the risks of 5-fluorouracil including but not limited to erythema, scaling, itching, weeping, crusting, and pain. Hydroxyzine Counseling: Patient advised that the medication is sedating and not to drive a car after taking this medication.  Patient informed of potential adverse effects including but not limited to dry mouth, urinary retention, and blurry vision.  The patient verbalized understanding of the proper use and possible adverse effects of hydroxyzine.  All of the patient's questions and concerns were addressed. Acitretin Counseling:  I discussed with the patient the risks of acitretin including but not limited to hair loss, dry lips/skin/eyes, liver damage, hyperlipidemia, depression/suicidal ideation, photosensitivity.  Serious rare side effects can include but are not limited to pancreatitis, pseudotumor cerebri, bony changes, clot formation/stroke/heart attack.  Patient understands that alcohol is contraindicated since it can result in liver toxicity and significantly prolong the elimination of the drug by many years. Spironolactone Pregnancy And Lactation Text: This medication can cause feminization of the male fetus and should be avoided during pregnancy. The active metabolite is also found in breast milk. Colchicine Counseling:  Patient counseled regarding adverse effects including but not limited to stomach upset (nausea, vomiting, stomach pain, or diarrhea).  Patient instructed to limit alcohol consumption while taking this medication.  Colchicine may reduce blood counts especially with prolonged use.  The patient understands that monitoring of kidney function and blood counts may be required, especially at baseline. The patient verbalized understanding of the proper use and possible adverse effects of colchicine.  All of the patient's questions and concerns were addressed. Ilumya Counseling: I discussed with the patient the risks of tildrakizumab including but not limited to immunosuppression, malignancy, posterior leukoencephalopathy syndrome, and serious infections.  The patient understands that monitoring is required including a PPD at baseline and must alert us or the primary physician if symptoms of infection or other concerning signs are noted. Griseofulvin Pregnancy And Lactation Text: This medication is Pregnancy Category X and is known to cause serious birth defects. It is unknown if this medication is excreted in breast milk but breast feeding should be avoided. Elidel Counseling: Patient may experience a mild burning sensation during topical application. Elidel is not approved in children less than 2 years of age. There have been case reports of hematologic and skin malignancies in patients using topical calcineurin inhibitors although causality is questionable. Xeljanz Counseling: I discussed with the patient the risks of Xeljanz therapy including increased risk of infection, liver issues, headache, diarrhea, or cold symptoms. Live vaccines should be avoided. They were instructed to call if they have any problems. Ketoconazole Counseling:   Patient counseled regarding improving absorption with orange juice.  Adverse effects include but are not limited to breast enlargement, headache, diarrhea, nausea, upset stomach, liver function test abnormalities, taste disturbance, and stomach pain.  There is a rare possibility of liver failure that can occur when taking ketoconazole. The patient understands that monitoring of LFTs may be required, especially at baseline. The patient verbalized understanding of the proper use and possible adverse effects of ketoconazole.  All of the patient's questions and concerns were addressed. Rifampin Counseling: I discussed with the patient the risks of rifampin including but not limited to liver damage, kidney damage, red-orange body fluids, nausea/vomiting and severe allergy. Hydroxychloroquine Pregnancy And Lactation Text: This medication has been shown to cause fetal harm but it isn't assigned a Pregnancy Risk Category. There are small amounts excreted in breast milk. Cyclophosphamide Counseling:  I discussed with the patient the risks of cyclophosphamide including but not limited to hair loss, hormonal abnormalities, decreased fertility, abdominal pain, diarrhea, nausea and vomiting, bone marrow suppression and infection. The patient understands that monitoring is required while taking this medication. Cyclosporine Counseling:  I discussed with the patient the risks of cyclosporine including but not limited to hypertension, gingival hyperplasia,myelosuppression, immunosuppression, liver damage, kidney damage, neurotoxicity, lymphoma, and serious infections. The patient understands that monitoring is required including baseline blood pressure, CBC, CMP, lipid panel and uric acid, and then 1-2 times monthly CMP and blood pressure. Methotrexate Pregnancy And Lactation Text: This medication is Pregnancy Category X and is known to cause fetal harm. This medication is excreted in breast milk. Rituxan Pregnancy And Lactation Text: This medication is Pregnancy Category C and it isn't know if it is safe during pregnancy. It is unknown if this medication is excreted in breast milk but similar antibodies are known to be excreted. Siliq Counseling:  I discussed with the patient the risks of Siliq including but not limited to new or worsening depression, suicidal thoughts and behavior, immunosuppression, malignancy, posterior leukoencephalopathy syndrome, and serious infections.  The patient understands that monitoring is required including a PPD at baseline and must alert us or the primary physician if symptoms of infection or other concerning signs are noted. There is also a special program designed to monitor depression which is required with Siliq. Methotrexate Counseling:  Patient counseled regarding adverse effects of methotrexate including but not limited to nausea, vomiting, abnormalities in liver function tests. Patients may develop mouth sores, rash, diarrhea, and abnormalities in blood counts. The patient understands that monitoring is required including LFT's and blood counts.  There is a rare possibility of scarring of the liver and lung problems that can occur when taking methotrexate. Persistent nausea, loss of appetite, pale stools, dark urine, cough, and shortness of breath should be reported immediately. Patient advised to discontinue methotrexate treatment at least three months before attempting to become pregnant.  I discussed the need for folate supplements while taking methotrexate.  These supplements can decrease side effects during methotrexate treatment. The patient verbalized understanding of the proper use and possible adverse effects of methotrexate.  All of the patient's questions and concerns were addressed. Benzoyl Peroxide Pregnancy And Lactation Text: This medication is Pregnancy Category C. It is unknown if benzoyl peroxide is excreted in breast milk. Imiquimod Counseling:  I discussed with the patient the risks of imiquimod including but not limited to erythema, scaling, itching, weeping, crusting, and pain.  Patient understands that the inflammatory response to imiquimod is variable from person to person and was educated regarded proper titration schedule.  If flu-like symptoms develop, patient knows to discontinue the medication and contact us. Topical Sulfur Applications Pregnancy And Lactation Text: This medication is Pregnancy Category C and has an unknown safety profile during pregnancy. It is unknown if this topical medication is excreted in breast milk. Isotretinoin Pregnancy And Lactation Text: This medication is Pregnancy Category X and is considered extremely dangerous during pregnancy. It is unknown if it is excreted in breast milk. Xolair Pregnancy And Lactation Text: This medication is Pregnancy Category B and is considered safe during pregnancy. This medication is excreted in breast milk. Hydroquinone Counseling:  Patient advised that medication may result in skin irritation, lightening (hypopigmentation), dryness, and burning.  In the event of skin irritation, the patient was advised to reduce the amount of the drug applied or use it less frequently.  Rarely, spots that are treated with hydroquinone can become darker (pseudoochronosis).  Should this occur, patient instructed to stop medication and call the office. The patient verbalized understanding of the proper use and possible adverse effects of hydroquinone.  All of the patient's questions and concerns were addressed. Terbinafine Pregnancy And Lactation Text: This medication is Pregnancy Category B and is considered safe during pregnancy. It is also excreted in breast milk and breast feeding isn't recommended. Taltz Counseling: I discussed with the patient the risks of ixekizumab including but not limited to immunosuppression, serious infections, worsening of inflammatory bowel disease and drug reactions.  The patient understands that monitoring is required including a PPD at baseline and must alert us or the primary physician if symptoms of infection or other concerning signs are noted. Cyclophosphamide Pregnancy And Lactation Text: This medication is Pregnancy Category D and it isn't considered safe during pregnancy. This medication is excreted in breast milk. Albendazole Counseling:  I discussed with the patient the risks of albendazole including but not limited to cytopenia, kidney damage, nausea/vomiting and severe allergy.  The patient understands that this medication is being used in an off-label manner. Cellcept Counseling:  I discussed with the patient the risks of mycophenolate mofetil including but not limited to infection/immunosuppression, GI upset, hypokalemia, hypercholesterolemia, bone marrow suppression, lymphoproliferative disorders, malignancy, GI ulceration/bleed/perforation, colitis, interstitial lung disease, kidney failure, progressive multifocal leukoencephalopathy, and birth defects.  The patient understands that monitoring is required including a baseline creatinine and regular CBC testing. In addition, patient must alert us immediately if symptoms of infection or other concerning signs are noted. Tetracycline Counseling: Patient counseled regarding possible photosensitivity and increased risk for sunburn.  Patient instructed to avoid sunlight, if possible.  When exposed to sunlight, patients should wear protective clothing, sunglasses, and sunscreen.  The patient was instructed to call the office immediately if the following severe adverse effects occur:  hearing changes, easy bruising/bleeding, severe headache, or vision changes.  The patient verbalized understanding of the proper use and possible adverse effects of tetracycline.  All of the patient's questions and concerns were addressed. Patient understands to avoid pregnancy while on therapy due to potential birth defects. Azathioprine Counseling:  I discussed with the patient the risks of azathioprine including but not limited to myelosuppression, immunosuppression, hepatotoxicity, lymphoma, and infections.  The patient understands that monitoring is required including baseline LFTs, Creatinine, possible TPMP genotyping and weekly CBCs for the first month and then every 2 weeks thereafter.  The patient verbalized understanding of the proper use and possible adverse effects of azathioprine.  All of the patient's questions and concerns were addressed. Detail Level: Detailed Solaraze Pregnancy And Lactation Text: This medication is Pregnancy Category B and is considered safe. There is some data to suggest avoiding during the third trimester. It is unknown if this medication is excreted in breast milk. Topical Retinoid counseling:  Patient advised to apply a pea-sized amount only at bedtime and wait 30 minutes after washing their face before applying.  If too drying, patient may add a non-comedogenic moisturizer. The patient verbalized understanding of the proper use and possible adverse effects of retinoids.  All of the patient's questions and concerns were addressed. Minocycline Counseling: Patient advised regarding possible photosensitivity and discoloration of the teeth, skin, lips, tongue and gums.  Patient instructed to avoid sunlight, if possible.  When exposed to sunlight, patients should wear protective clothing, sunglasses, and sunscreen.  The patient was instructed to call the office immediately if the following severe adverse effects occur:  hearing changes, easy bruising/bleeding, severe headache, or vision changes.  The patient verbalized understanding of the proper use and possible adverse effects of minocycline.  All of the patient's questions and concerns were addressed. Clindamycin Counseling: I counseled the patient regarding use of clindamycin as an antibiotic for prophylactic and/or therapeutic purposes. Clindamycin is active against numerous classes of bacteria, including skin bacteria. Side effects may include nausea, diarrhea, gastrointestinal upset, rash, hives, yeast infections, and in rare cases, colitis. Tazorac Counseling:  Patient advised that medication is irritating and drying.  Patient may need to apply sparingly and wash off after an hour before eventually leaving it on overnight.  The patient verbalized understanding of the proper use and possible adverse effects of tazorac.  All of the patient's questions and concerns were addressed. Cephalexin Counseling: I counseled the patient regarding use of cephalexin as an antibiotic for prophylactic and/or therapeutic purposes. Cephalexin (commonly prescribed under brand name Keflex) is a cephalosporin antibiotic which is active against numerous classes of bacteria, including most skin bacteria. Side effects may include nausea, diarrhea, gastrointestinal upset, rash, hives, yeast infections, and in rare cases, hepatitis, kidney disease, seizures, fever, confusion, neurologic symptoms, and others. Patients with severe allergies to penicillin medications are cautioned that there is about a 10% incidence of cross-reactivity with cephalosporins. When possible, patients with penicillin allergies should use alternatives to cephalosporins for antibiotic therapy.

## 2022-05-30 ENCOUNTER — APPOINTMENT (OUTPATIENT)
Dept: GENERAL RADIOLOGY | Age: 52
End: 2022-05-30
Attending: PHYSICIAN ASSISTANT
Payer: MEDICAID

## 2022-05-30 ENCOUNTER — HOSPITAL ENCOUNTER (EMERGENCY)
Age: 52
Discharge: HOME | End: 2022-05-30
Attending: STUDENT IN AN ORGANIZED HEALTH CARE EDUCATION/TRAINING PROGRAM
Payer: MEDICAID

## 2022-05-30 ENCOUNTER — APPOINTMENT (OUTPATIENT)
Dept: CT IMAGING | Age: 52
End: 2022-05-30
Attending: PHYSICIAN ASSISTANT
Payer: MEDICAID

## 2022-05-30 VITALS
DIASTOLIC BLOOD PRESSURE: 81 MMHG | HEART RATE: 91 BPM | TEMPERATURE: 98.2 F | RESPIRATION RATE: 16 BRPM | HEIGHT: 73 IN | OXYGEN SATURATION: 98 % | BODY MASS INDEX: 23.33 KG/M2 | SYSTOLIC BLOOD PRESSURE: 120 MMHG | WEIGHT: 176 LBS

## 2022-05-30 DIAGNOSIS — S29.8XXA BLUNT CHEST TRAUMA, INITIAL ENCOUNTER: Primary | ICD-10-CM
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
## 2022-05-30 DIAGNOSIS — M25.562 ACUTE PAIN OF LEFT KNEE: ICD-10-CM

## 2022-05-30 DIAGNOSIS — T07.XXXA ABRASIONS OF MULTIPLE SITES: ICD-10-CM

## 2022-05-30 DIAGNOSIS — M79.605 ACUTE LEG PAIN, LEFT: ICD-10-CM

## 2022-05-30 DIAGNOSIS — S39.91XA BLUNT ABDOMINAL TRAUMA, INITIAL ENCOUNTER: ICD-10-CM

## 2022-05-30 LAB
ABO + RH BLD: NORMAL
ALBUMIN SERPL-MCNC: 3.1 G/DL (ref 3.4–5)
ALBUMIN/GLOB SERPL: 1.3 {RATIO} (ref 0.8–1.7)
ALP SERPL-CCNC: 75 U/L (ref 45–117)
ALT SERPL-CCNC: 22 U/L (ref 16–61)
ANION GAP SERPL CALC-SCNC: 5 MMOL/L (ref 3–18)
AST SERPL-CCNC: 28 U/L (ref 10–38)
BASOPHILS # BLD: 0.1 K/UL (ref 0–0.1)
BASOPHILS NFR BLD: 1 % (ref 0–2)
BILIRUB SERPL-MCNC: 0.7 MG/DL (ref 0.2–1)
BLOOD GROUP ANTIBODIES SERPL: NORMAL
BUN SERPL-MCNC: 9 MG/DL (ref 7–18)
BUN/CREAT SERPL: 9 (ref 12–20)
CALCIUM SERPL-MCNC: 8.7 MG/DL (ref 8.5–10.1)
CHLORIDE SERPL-SCNC: 108 MMOL/L (ref 100–111)
CO2 SERPL-SCNC: 29 MMOL/L (ref 21–32)
CREAT SERPL-MCNC: 1.05 MG/DL (ref 0.6–1.3)
DIFFERENTIAL METHOD BLD: ABNORMAL
EOSINOPHIL # BLD: 0.1 K/UL (ref 0–0.4)
EOSINOPHIL NFR BLD: 1 % (ref 0–5)
ERYTHROCYTE [DISTWIDTH] IN BLOOD BY AUTOMATED COUNT: 12.9 % (ref 11.6–14.5)
GLOBULIN SER CALC-MCNC: 2.3 G/DL (ref 2–4)
GLUCOSE SERPL-MCNC: 168 MG/DL (ref 74–99)
HCT VFR BLD AUTO: 42.8 % (ref 36–48)
HGB BLD-MCNC: 14.8 G/DL (ref 13–16)
IMM GRANULOCYTES # BLD AUTO: 0 K/UL (ref 0–0.04)
IMM GRANULOCYTES NFR BLD AUTO: 0 % (ref 0–0.5)
INR PPP: 1.1 (ref 0.8–1.2)
LYMPHOCYTES # BLD: 1.3 K/UL (ref 0.9–3.6)
LYMPHOCYTES NFR BLD: 13 % (ref 21–52)
MAGNESIUM SERPL-MCNC: 2 MG/DL (ref 1.6–2.6)
MCH RBC QN AUTO: 32.7 PG (ref 24–34)
MCHC RBC AUTO-ENTMCNC: 34.6 G/DL (ref 31–37)
MCV RBC AUTO: 94.5 FL (ref 78–100)
MONOCYTES # BLD: 0.7 K/UL (ref 0.05–1.2)
MONOCYTES NFR BLD: 6 % (ref 3–10)
NEUTS SEG # BLD: 8.3 K/UL (ref 1.8–8)
NEUTS SEG NFR BLD: 79 % (ref 40–73)
NRBC # BLD: 0 K/UL (ref 0–0.01)
NRBC BLD-RTO: 0 PER 100 WBC
PLATELET # BLD AUTO: 170 K/UL (ref 135–420)
PMV BLD AUTO: 9.7 FL (ref 9.2–11.8)
POTASSIUM SERPL-SCNC: 4 MMOL/L (ref 3.5–5.5)
PROT SERPL-MCNC: 5.4 G/DL (ref 6.4–8.2)
PROTHROMBIN TIME: 14.7 SEC (ref 11.5–15.2)
RBC # BLD AUTO: 4.53 M/UL (ref 4.35–5.65)
SODIUM SERPL-SCNC: 142 MMOL/L (ref 136–145)
SPECIMEN EXP DATE BLD: NORMAL
TROPONIN-HIGH SENSITIVITY: 5 NG/L (ref 0–78)
WBC # BLD AUTO: 10.5 K/UL (ref 4.6–13.2)

## 2022-05-30 PROCEDURE — 85610 PROTHROMBIN TIME: CPT

## 2022-05-30 PROCEDURE — 83735 ASSAY OF MAGNESIUM: CPT

## 2022-05-30 PROCEDURE — 72125 CT NECK SPINE W/O DYE: CPT

## 2022-05-30 PROCEDURE — 73590 X-RAY EXAM OF LOWER LEG: CPT

## 2022-05-30 PROCEDURE — 80053 COMPREHEN METABOLIC PANEL: CPT

## 2022-05-30 PROCEDURE — 70450 CT HEAD/BRAIN W/O DYE: CPT

## 2022-05-30 PROCEDURE — 73552 X-RAY EXAM OF FEMUR 2/>: CPT

## 2022-05-30 PROCEDURE — 96374 THER/PROPH/DIAG INJ IV PUSH: CPT

## 2022-05-30 PROCEDURE — 71260 CT THORAX DX C+: CPT

## 2022-05-30 PROCEDURE — 85025 COMPLETE CBC W/AUTO DIFF WBC: CPT

## 2022-05-30 PROCEDURE — 86900 BLOOD TYPING SEROLOGIC ABO: CPT

## 2022-05-30 PROCEDURE — 72170 X-RAY EXAM OF PELVIS: CPT

## 2022-05-30 PROCEDURE — 99285 EMERGENCY DEPT VISIT HI MDM: CPT

## 2022-05-30 PROCEDURE — 84484 ASSAY OF TROPONIN QUANT: CPT

## 2022-05-30 PROCEDURE — 74011000636 HC RX REV CODE- 636: Performed by: STUDENT IN AN ORGANIZED HEALTH CARE EDUCATION/TRAINING PROGRAM

## 2022-05-30 RX ORDER — ACETAMINOPHEN 500 MG
1000 TABLET ORAL ONCE
Status: DISCONTINUED | OUTPATIENT
Start: 2022-05-30 | End: 2022-05-30 | Stop reason: HOSPADM

## 2022-05-30 RX ORDER — ACETAMINOPHEN 325 MG/1
650 TABLET ORAL
Qty: 20 TABLET | Refills: 0 | Status: SHIPPED | OUTPATIENT
Start: 2022-05-30

## 2022-05-30 RX ORDER — BACITRACIN 500 [USP'U]/G
OINTMENT TOPICAL 3 TIMES DAILY
Qty: 10 G | Refills: 0 | Status: SHIPPED | OUTPATIENT
Start: 2022-05-30 | End: 2022-06-09

## 2022-05-30 RX ORDER — LIDOCAINE 50 MG/G
PATCH TOPICAL
Qty: 14 EACH | Refills: 0 | Status: SHIPPED | OUTPATIENT
Start: 2022-05-30

## 2022-05-30 RX ADMIN — IOPAMIDOL 100 ML: 612 INJECTION, SOLUTION INTRAVENOUS at 16:40
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
## 2022-06-20 ENCOUNTER — HOSPITAL ENCOUNTER (EMERGENCY)
Age: 52
Discharge: LEFT BEFORE DISCHARGE | End: 2022-06-20
Attending: EMERGENCY MEDICINE
Payer: MEDICAID

## 2022-06-20 ENCOUNTER — HOSPITAL ENCOUNTER (EMERGENCY)
Age: 52
Discharge: HOME | End: 2022-06-20
Attending: STUDENT IN AN ORGANIZED HEALTH CARE EDUCATION/TRAINING PROGRAM
Payer: MEDICAID

## 2022-06-20 ENCOUNTER — APPOINTMENT (OUTPATIENT)
Dept: GENERAL RADIOLOGY | Age: 52
End: 2022-06-20
Attending: PHYSICIAN ASSISTANT
Payer: MEDICAID

## 2022-06-20 VITALS
SYSTOLIC BLOOD PRESSURE: 133 MMHG | DIASTOLIC BLOOD PRESSURE: 75 MMHG | HEART RATE: 79 BPM | TEMPERATURE: 98.6 F | RESPIRATION RATE: 18 BRPM | OXYGEN SATURATION: 99 %

## 2022-06-20 VITALS
SYSTOLIC BLOOD PRESSURE: 124 MMHG | HEART RATE: 83 BPM | WEIGHT: 160 LBS | BODY MASS INDEX: 21.2 KG/M2 | DIASTOLIC BLOOD PRESSURE: 75 MMHG | TEMPERATURE: 98.1 F | RESPIRATION RATE: 16 BRPM | OXYGEN SATURATION: 100 % | HEIGHT: 73 IN

## 2022-06-20 DIAGNOSIS — Z53.21 ELOPED FROM EMERGENCY DEPARTMENT: Primary | ICD-10-CM

## 2022-06-20 DIAGNOSIS — T14.8XXA: ICD-10-CM

## 2022-06-20 DIAGNOSIS — S63.259A DISLOCATION OF FINGER, INITIAL ENCOUNTER: Primary | ICD-10-CM

## 2022-06-20 LAB
ALBUMIN SERPL-MCNC: 3 G/DL (ref 3.4–5)
ALBUMIN/GLOB SERPL: 1.2 {RATIO} (ref 0.8–1.7)
ALP SERPL-CCNC: 139 U/L (ref 45–117)
ALT SERPL-CCNC: 28 U/L (ref 16–61)
ANION GAP SERPL CALC-SCNC: 4 MMOL/L (ref 3–18)
AST SERPL-CCNC: 30 U/L (ref 10–38)
BASOPHILS # BLD: 0 K/UL (ref 0–0.1)
BASOPHILS NFR BLD: 1 % (ref 0–2)
BILIRUB SERPL-MCNC: 0.7 MG/DL (ref 0.2–1)
BUN SERPL-MCNC: 7 MG/DL (ref 7–18)
BUN/CREAT SERPL: 8 (ref 12–20)
CALCIUM SERPL-MCNC: 8.6 MG/DL (ref 8.5–10.1)
CHLORIDE SERPL-SCNC: 109 MMOL/L (ref 100–111)
CO2 SERPL-SCNC: 28 MMOL/L (ref 21–32)
CREAT SERPL-MCNC: 0.91 MG/DL (ref 0.6–1.3)
DIFFERENTIAL METHOD BLD: NORMAL
EOSINOPHIL # BLD: 0.1 K/UL (ref 0–0.4)
EOSINOPHIL NFR BLD: 2 % (ref 0–5)
ERYTHROCYTE [DISTWIDTH] IN BLOOD BY AUTOMATED COUNT: 13.2 % (ref 11.6–14.5)
GLOBULIN SER CALC-MCNC: 2.6 G/DL (ref 2–4)
GLUCOSE SERPL-MCNC: 89 MG/DL (ref 74–99)
HCT VFR BLD AUTO: 40.5 % (ref 36–48)
HGB BLD-MCNC: 14.5 G/DL (ref 13–16)
IMM GRANULOCYTES # BLD AUTO: 0 K/UL (ref 0–0.04)
IMM GRANULOCYTES NFR BLD AUTO: 0 % (ref 0–0.5)
LYMPHOCYTES # BLD: 1.9 K/UL (ref 0.9–3.6)
LYMPHOCYTES NFR BLD: 22 % (ref 21–52)
MCH RBC QN AUTO: 33 PG (ref 24–34)
MCHC RBC AUTO-ENTMCNC: 35.8 G/DL (ref 31–37)
MCV RBC AUTO: 92.3 FL (ref 78–100)
MONOCYTES # BLD: 0.9 K/UL (ref 0.05–1.2)
MONOCYTES NFR BLD: 10 % (ref 3–10)
NEUTS SEG # BLD: 5.8 K/UL (ref 1.8–8)
NEUTS SEG NFR BLD: 66 % (ref 40–73)
NRBC # BLD: 0 K/UL (ref 0–0.01)
NRBC BLD-RTO: 0 PER 100 WBC
PLATELET # BLD AUTO: 167 K/UL (ref 135–420)
PMV BLD AUTO: 9.8 FL (ref 9.2–11.8)
POTASSIUM SERPL-SCNC: 3.3 MMOL/L (ref 3.5–5.5)
PROT SERPL-MCNC: 5.6 G/DL (ref 6.4–8.2)
RBC # BLD AUTO: 4.39 M/UL (ref 4.35–5.65)
SODIUM SERPL-SCNC: 141 MMOL/L (ref 136–145)
WBC # BLD AUTO: 8.7 K/UL (ref 4.6–13.2)

## 2022-06-20 PROCEDURE — 96374 THER/PROPH/DIAG INJ IV PUSH: CPT

## 2022-06-20 PROCEDURE — 99285 EMERGENCY DEPT VISIT HI MDM: CPT

## 2022-06-20 PROCEDURE — 96375 TX/PRO/DX INJ NEW DRUG ADDON: CPT

## 2022-06-20 PROCEDURE — 93005 ELECTROCARDIOGRAM TRACING: CPT

## 2022-06-20 PROCEDURE — 73130 X-RAY EXAM OF HAND: CPT

## 2022-06-20 PROCEDURE — 85025 COMPLETE CBC W/AUTO DIFF WBC: CPT

## 2022-06-20 PROCEDURE — 74011250636 HC RX REV CODE- 250/636: Performed by: PHYSICIAN ASSISTANT

## 2022-06-20 PROCEDURE — 75810000303 HC CLSD TRMT  FRACTURE/DISLOCATION W/  ANES

## 2022-06-20 PROCEDURE — 80053 COMPREHEN METABOLIC PANEL: CPT

## 2022-06-20 PROCEDURE — 73140 X-RAY EXAM OF FINGER(S): CPT

## 2022-06-20 PROCEDURE — 75810000293 HC SIMP/SUPERF WND  RPR

## 2022-06-20 RX ORDER — MORPHINE SULFATE 4 MG/ML
4 INJECTION INTRAVENOUS
Status: COMPLETED | OUTPATIENT
Start: 2022-06-20 | End: 2022-06-20

## 2022-06-20 RX ORDER — MORPHINE SULFATE 2 MG/ML
2 INJECTION, SOLUTION INTRAMUSCULAR; INTRAVENOUS
Status: DISCONTINUED | OUTPATIENT
Start: 2022-06-20 | End: 2022-06-20

## 2022-06-20 RX ORDER — IBUPROFEN 600 MG/1
600 TABLET ORAL
Qty: 20 TABLET | Refills: 0 | Status: SHIPPED | OUTPATIENT
Start: 2022-06-20

## 2022-06-20 RX ORDER — CEFAZOLIN SODIUM 1 G/3ML
2 INJECTION, POWDER, FOR SOLUTION INTRAMUSCULAR; INTRAVENOUS
Status: DISCONTINUED | OUTPATIENT
Start: 2022-06-20 | End: 2022-06-20

## 2022-06-20 RX ORDER — CEPHALEXIN 500 MG/1
500 CAPSULE ORAL 4 TIMES DAILY
Qty: 28 CAPSULE | Refills: 0 | Status: SHIPPED | OUTPATIENT
Start: 2022-06-20 | End: 2022-06-27

## 2022-06-20 RX ORDER — CEFAZOLIN SODIUM 1 G/3ML
2 INJECTION, POWDER, FOR SOLUTION INTRAMUSCULAR; INTRAVENOUS
Status: COMPLETED | OUTPATIENT
Start: 2022-06-20 | End: 2022-06-20

## 2022-06-20 RX ORDER — OXYCODONE AND ACETAMINOPHEN 5; 325 MG/1; MG/1
1 TABLET ORAL
Qty: 15 TABLET | Refills: 0 | Status: SHIPPED | OUTPATIENT
Start: 2022-06-20 | End: 2022-06-27

## 2022-06-20 RX ADMIN — MORPHINE SULFATE 4 MG: 4 INJECTION, SOLUTION INTRAMUSCULAR; INTRAVENOUS at 21:59

## 2022-06-20 RX ADMIN — CEFAZOLIN 2 G: 330 INJECTION, POWDER, FOR SOLUTION INTRAMUSCULAR; INTRAVENOUS at 21:19
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
## 2022-06-20 NOTE — ED NOTES
Patient refused to have blood work done, decided he didn't want to have surgery on his wound. Attempted to explain that he could loose his finger and/or hand. Patient states Zack Kirbyters a good day\" and walked out.
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
## 2022-06-20 NOTE — ED NOTES
I performed a brief history of the patient here in triage and I have determined that pt will need further treatment and evaluation from the main side ER physician or [facility]. I have placed initial orders based on the history to help in expediting patients care.        MICHELE Hart

## 2022-06-21 LAB
ATRIAL RATE: 68 BPM
CALCULATED P AXIS, ECG09: 75 DEGREES
CALCULATED R AXIS, ECG10: 75 DEGREES
CALCULATED T AXIS, ECG11: 55 DEGREES
DIAGNOSIS, 93000: NORMAL
P-R INTERVAL, ECG05: 150 MS
Q-T INTERVAL, ECG07: 410 MS
QRS DURATION, ECG06: 92 MS
QTC CALCULATION (BEZET), ECG08: 435 MS
VENTRICULAR RATE, ECG03: 68 BPM

## 2022-06-22 ENCOUNTER — HOSPITAL ENCOUNTER (EMERGENCY)
Age: 52
Discharge: HOME | End: 2022-06-22
Attending: STUDENT IN AN ORGANIZED HEALTH CARE EDUCATION/TRAINING PROGRAM | Admitting: STUDENT IN AN ORGANIZED HEALTH CARE EDUCATION/TRAINING PROGRAM
Payer: MEDICAID

## 2022-06-22 VITALS
HEART RATE: 87 BPM | OXYGEN SATURATION: 99 % | SYSTOLIC BLOOD PRESSURE: 100 MMHG | RESPIRATION RATE: 18 BRPM | WEIGHT: 160 LBS | TEMPERATURE: 98.2 F | HEIGHT: 73 IN | DIASTOLIC BLOOD PRESSURE: 73 MMHG | BODY MASS INDEX: 21.2 KG/M2

## 2022-06-22 DIAGNOSIS — Z48.02 VISIT FOR SUTURE REMOVAL: Primary | ICD-10-CM

## 2022-06-22 DIAGNOSIS — Z00.8 MEDICAL CLEARANCE FOR PSYCHIATRIC ADMISSION: ICD-10-CM

## 2022-06-22 LAB
ALBUMIN SERPL-MCNC: 2.8 G/DL (ref 3.4–5)
ALBUMIN/GLOB SERPL: 1.1 {RATIO} (ref 0.8–1.7)
ALP SERPL-CCNC: 168 U/L (ref 45–117)
ALT SERPL-CCNC: 35 U/L (ref 16–61)
ANION GAP SERPL CALC-SCNC: 4 MMOL/L (ref 3–18)
AST SERPL-CCNC: 74 U/L (ref 10–38)
ATRIAL RATE: 84 BPM
BASOPHILS # BLD: 0.1 K/UL (ref 0–0.1)
BASOPHILS NFR BLD: 1 % (ref 0–2)
BILIRUB SERPL-MCNC: 1 MG/DL (ref 0.2–1)
BUN SERPL-MCNC: 10 MG/DL (ref 7–18)
BUN/CREAT SERPL: 13 (ref 12–20)
CALCIUM SERPL-MCNC: 8.4 MG/DL (ref 8.5–10.1)
CALCULATED P AXIS, ECG09: 86 DEGREES
CALCULATED R AXIS, ECG10: 86 DEGREES
CALCULATED T AXIS, ECG11: 78 DEGREES
CHLORIDE SERPL-SCNC: 111 MMOL/L (ref 100–111)
CO2 SERPL-SCNC: 26 MMOL/L (ref 21–32)
CREAT SERPL-MCNC: 0.79 MG/DL (ref 0.6–1.3)
DIAGNOSIS, 93000: NORMAL
DIFFERENTIAL METHOD BLD: ABNORMAL
EOSINOPHIL # BLD: 0.3 K/UL (ref 0–0.4)
EOSINOPHIL NFR BLD: 3 % (ref 0–5)
ERYTHROCYTE [DISTWIDTH] IN BLOOD BY AUTOMATED COUNT: 13.2 % (ref 11.6–14.5)
GLOBULIN SER CALC-MCNC: 2.5 G/DL (ref 2–4)
GLUCOSE SERPL-MCNC: 118 MG/DL (ref 74–99)
HCT VFR BLD AUTO: 38.7 % (ref 36–48)
HGB BLD-MCNC: 13.6 G/DL (ref 13–16)
IMM GRANULOCYTES # BLD AUTO: 0 K/UL (ref 0–0.04)
IMM GRANULOCYTES NFR BLD AUTO: 0 % (ref 0–0.5)
LIPASE SERPL-CCNC: 215 U/L (ref 73–393)
LYMPHOCYTES # BLD: 1.5 K/UL (ref 0.9–3.6)
LYMPHOCYTES NFR BLD: 18 % (ref 21–52)
MAGNESIUM SERPL-MCNC: 1.9 MG/DL (ref 1.6–2.6)
MCH RBC QN AUTO: 32.5 PG (ref 24–34)
MCHC RBC AUTO-ENTMCNC: 35.1 G/DL (ref 31–37)
MCV RBC AUTO: 92.4 FL (ref 78–100)
MONOCYTES # BLD: 0.8 K/UL (ref 0.05–1.2)
MONOCYTES NFR BLD: 10 % (ref 3–10)
NEUTS SEG # BLD: 5.7 K/UL (ref 1.8–8)
NEUTS SEG NFR BLD: 68 % (ref 40–73)
NRBC # BLD: 0 K/UL (ref 0–0.01)
NRBC BLD-RTO: 0 PER 100 WBC
P-R INTERVAL, ECG05: 144 MS
PLATELET # BLD AUTO: 169 K/UL (ref 135–420)
PMV BLD AUTO: 9.9 FL (ref 9.2–11.8)
POTASSIUM SERPL-SCNC: 4.2 MMOL/L (ref 3.5–5.5)
PROT SERPL-MCNC: 5.3 G/DL (ref 6.4–8.2)
Q-T INTERVAL, ECG07: 368 MS
QRS DURATION, ECG06: 90 MS
QTC CALCULATION (BEZET), ECG08: 434 MS
RBC # BLD AUTO: 4.19 M/UL (ref 4.35–5.65)
SODIUM SERPL-SCNC: 141 MMOL/L (ref 136–145)
VENTRICULAR RATE, ECG03: 84 BPM
WBC # BLD AUTO: 8.4 K/UL (ref 4.6–13.2)

## 2022-06-22 PROCEDURE — 85025 COMPLETE CBC W/AUTO DIFF WBC: CPT

## 2022-06-22 PROCEDURE — 83735 ASSAY OF MAGNESIUM: CPT

## 2022-06-22 PROCEDURE — 93005 ELECTROCARDIOGRAM TRACING: CPT

## 2022-06-22 PROCEDURE — 99284 EMERGENCY DEPT VISIT MOD MDM: CPT

## 2022-06-22 PROCEDURE — 83690 ASSAY OF LIPASE: CPT

## 2022-06-22 PROCEDURE — 80053 COMPREHEN METABOLIC PANEL: CPT

## 2022-06-22 NOTE — Clinical Note
Dorys Camryn was seen and treated in our emergency department on 6/22/2022. Patient has been medically cleared and had a reassuring set of lab work and EKG done.     Sp Stephen

## 2022-06-22 NOTE — ED PROVIDER NOTES
EMERGENCY DEPARTMENT HISTORY AND PHYSICAL EXAM    Date: 6/22/2022  Patient Name: Nestor Jones    History of Presenting Illness     Chief Complaint   Patient presents with    Suture Removal         History Provided By: Patient    Chief Complaint: Suture removal, alcohol and substance abuse  Duration: Days  Timing: Improving  Location: Left fifth digit  Quality: Healing  Severity: Mild  Modifying Factors: None  Associated Symptoms: none       Additional History (Context): Nestor Jones is a 47 y.o. male with a history of ADHD, bipolar disorder, schizophrenia who presents today for history as listed above. Patient reports that he is seeking treatment for substance and alcohol abuse and has been asked to bring a medical clearance letter. Patient also states that he is unable to go to this facility if he has stitches. Patient reports he got stitches 3 days ago and has no concerns for the wound. Patient states \"I am willing to risk it\". Patient has no other complaints at this time. Patient is concerned if he is unable to get into this facility that he will not make it another 2 to 3 days on the streets      PCP: None    Current Outpatient Medications   Medication Sig Dispense Refill    oxyCODONE-acetaminophen (Percocet) 5-325 mg per tablet Take 1 Tablet by mouth every six (6) hours as needed for Pain for up to 7 days. Max Daily Amount: 4 Tablets. 15 Tablet 0    ibuprofen (MOTRIN) 600 mg tablet Take 1 Tablet by mouth every six (6) hours as needed for Pain. 20 Tablet 0    cephALEXin (Keflex) 500 mg capsule Take 1 Capsule by mouth four (4) times daily for 7 days. 28 Capsule 0    lidocaine (Lidoderm) 5 % Apply patch to the affected area for 12 hours a day and remove for 12 hours a day. 14 Each 0    acetaminophen (TYLENOL) 325 mg tablet Take 2 Tablets by mouth every four (4) hours as needed for Pain. 20 Tablet 0    hydrOXYzine HCl (ATARAX) 50 mg tablet Take 100 mg by mouth daily.       raNITIdine (ZANTAC) 150 mg tablet Take 150 mg by mouth daily.  benztropine (COGENTIN) 1 mg tablet Take 1 mg by mouth daily.  lithium carbonate 300 mg capsule Take 600 mg by mouth nightly.  haloperidol (HALDOL) 20 mg tablet Take 20 mg by mouth.  OLANZapine (ZYPREXA) 10 mg tablet Take 1 Tab by mouth nightly. Indications: Schizoaffective Disorder 30 Tab 0       Past History     Past Medical History:  Past Medical History:   Diagnosis Date    ADHD     B[facility] affective ([facility] [address]     Cocaine use 05/2018    uds positive    Schizophrenia ([facility] [address]     Smoker        Past Surgical History:  No past surgical history on file. Family History:  Family History   Adopted: Yes       Social History:  Social History     Tobacco Use    Smoking status: Current Every Day Smoker     Packs/day: 1.00    Smokeless tobacco: Never Used   Substance Use Topics    Alcohol use: Yes     Comment: gallon a day    Drug use: Yes     Types: Cocaine       Allergies:  No Known Allergies      Review of Systems   Review of Systems   Constitutional: Negative for chills and fever. HENT: Negative for congestion, rhinorrhea and sore throat. Respiratory: Negative for cough and shortness of breath. Cardiovascular: Negative for chest pain. Gastrointestinal: Negative for abdominal pain, blood in stool, constipation, diarrhea, nausea and vomiting. Genitourinary: Negative for dysuria, frequency and hematuria. Musculoskeletal: Negative for back pain and myalgias. Skin: Positive for wound. Negative for rash. Neurological: Negative for dizziness and headaches. All other systems reviewed and are negative. All Other Systems Negative  Physical Exam     Vitals:    06/22/22 1626   BP: 100/73   Pulse: 87   Resp: 18   Temp: 98.2 °F (36.8 °C)   SpO2: 99%   Weight: 72.6 kg (160 lb)   Height: 6' 1\" (1.854 m)     Physical Exam  Vitals and nursing note reviewed. Constitutional:       General: He is not in acute distress.      Appearance: He is well-developed. He is not diaphoretic. HENT:      Head: Normocephalic and atraumatic. Eyes:      Conjunctiva/sclera: Conjunctivae normal.   Cardiovascular:      Rate and Rhythm: Normal rate and regular rhythm. Heart sounds: Normal heart sounds. Pulmonary:      Effort: Pulmonary effort is normal. No respiratory distress. Breath sounds: Normal breath sounds. Chest:      Chest wall: No tenderness. Abdominal:      General: Bowel sounds are normal. There is no distension. Palpations: Abdomen is soft. Tenderness: There is no abdominal tenderness. There is no guarding or rebound. Musculoskeletal:         General: No deformity. Normal range of motion. Cervical back: Normal range of motion and neck supple. Skin:     General: Skin is warm and dry. Findings: Laceration present. Comments: Left 5th digit well appearing finger laceration noted with 4 sutures in place   Neurological:      Mental Status: He is alert and oriented to person, place, and time. Diagnostic Study Results     Labs -     No results found for this or any previous visit (from the past 12 hour(s)). Radiologic Studies -   No orders to display     CT Results  (Last 48 hours)    None        CXR Results  (Last 48 hours)    None            Medical Decision Making   I am the first provider for this patient. I reviewed the vital signs, available nursing notes, past medical history, past surgical history, family history and social history. Vital Signs-Reviewed the patient's vital signs.       Records Reviewed: Nursing Notes and Old Medical Records     Procedures: None   Suture/Staple Removal    Date/Time: 6/22/2022 6:51 PM  Performed by: Emiliano Fothergill, PA  Authorized by: Emiliano Fothergill, PA     Consent:     Consent obtained:  Verbal    Consent given by:  Patient    Risks discussed:  Bleeding and pain    Alternatives discussed:  Delayed treatment and no treatment  Location:     Location: left pinky   Procedure details:     Wound appearance:  No signs of infection, good wound healing and clean    Number of sutures removed:  4  Post-procedure details:     Post-removal:  Steri-Strips applied    Patient tolerance of procedure: Tolerated well, no immediate complications        Provider Notes (Medical Decision Making):     differential diagnosis: Medical clearance, substance abuse, alcohol abuse, laceration, abscess, cellulitis    Plan: Have discussed with patient I will remove his sutures however have discussed it will put him at a very high risk for his wound opening. We will place a Steri-Strip to try to prevent this. Order medical clearance labs and EKG per patient request.             MED RECONCILIATION:  No current facility-administered medications for this encounter. Current Outpatient Medications   Medication Sig    oxyCODONE-acetaminophen (Percocet) 5-325 mg per tablet Take 1 Tablet by mouth every six (6) hours as needed for Pain for up to 7 days. Max Daily Amount: 4 Tablets.  ibuprofen (MOTRIN) 600 mg tablet Take 1 Tablet by mouth every six (6) hours as needed for Pain.  cephALEXin (Keflex) 500 mg capsule Take 1 Capsule by mouth four (4) times daily for 7 days.  lidocaine (Lidoderm) 5 % Apply patch to the affected area for 12 hours a day and remove for 12 hours a day.  acetaminophen (TYLENOL) 325 mg tablet Take 2 Tablets by mouth every four (4) hours as needed for Pain.  hydrOXYzine HCl (ATARAX) 50 mg tablet Take 100 mg by mouth daily.  raNITIdine (ZANTAC) 150 mg tablet Take 150 mg by mouth daily.  benztropine (COGENTIN) 1 mg tablet Take 1 mg by mouth daily.  lithium carbonate 300 mg capsule Take 600 mg by mouth nightly.  haloperidol (HALDOL) 20 mg tablet Take 20 mg by mouth.  OLANZapine (ZYPREXA) 10 mg tablet Take 1 Tab by mouth nightly. Indications: Schizoaffective Disorder       Disposition:  Home     DISCHARGE NOTE:   Pt has been reexamined.  Patient has no new complaints, changes, or physical findings. Care plan outlined and precautions discussed. Results of workup were reviewed with the patient. All medications were reviewed with the patient. All of pt's questions and concerns were addressed. Patient was instructed and agrees to follow up with PCP as well as to return to the ED upon further deterioration. Patient is ready to go home. Follow-up Information     Follow up With Specialties Details Why Contact Info    [facility] EMERGENCY DEPT Emergency Medicine  As needed [address] [place] [id number]  [phone]          Discharge Medication List as of 6/22/2022  6:23 PM              Diagnosis     Clinical Impression:   1. Visit for suture removal    2. Medical clearance for psychiatric admission          \"Please note that this dictation was completed with [employer], the computer voice recognition software. Quite often unanticipated grammatical, syntax, homophones, and other interpretive errors are inadvertently transcribed by the computer software. Please disregard these errors. Please excuse any errors that have escaped final proofreading. \"

## 2022-07-05 ENCOUNTER — HOSPITAL ENCOUNTER (EMERGENCY)
Age: 52
Discharge: LEFT BEFORE DISCHARGE | End: 2022-07-06
Attending: STUDENT IN AN ORGANIZED HEALTH CARE EDUCATION/TRAINING PROGRAM
Payer: COMMERCIAL

## 2022-07-05 ENCOUNTER — HOSPITAL ENCOUNTER (EMERGENCY)
Age: 52
Discharge: LEFT BEFORE DISCHARGE | End: 2022-07-05
Attending: EMERGENCY MEDICINE
Payer: COMMERCIAL

## 2022-07-05 VITALS
TEMPERATURE: 98.4 F | HEART RATE: 93 BPM | OXYGEN SATURATION: 100 % | RESPIRATION RATE: 16 BRPM | DIASTOLIC BLOOD PRESSURE: 72 MMHG | SYSTOLIC BLOOD PRESSURE: 106 MMHG

## 2022-07-05 DIAGNOSIS — F14.10 COCAINE ABUSE (HCC): ICD-10-CM

## 2022-07-05 DIAGNOSIS — F10.10 ALCOHOL ABUSE: Primary | ICD-10-CM

## 2022-07-05 LAB
ALBUMIN SERPL-MCNC: 3.1 G/DL (ref 3.4–5)
ALBUMIN/GLOB SERPL: 1 {RATIO} (ref 0.8–1.7)
ALP SERPL-CCNC: 113 U/L (ref 45–117)
ALT SERPL-CCNC: 30 U/L (ref 16–61)
ANION GAP SERPL CALC-SCNC: 4 MMOL/L (ref 3–18)
AST SERPL-CCNC: 27 U/L (ref 10–38)
BASOPHILS # BLD: 0 K/UL (ref 0–0.1)
BASOPHILS NFR BLD: 1 % (ref 0–2)
BILIRUB SERPL-MCNC: 0.5 MG/DL (ref 0.2–1)
BUN SERPL-MCNC: 12 MG/DL (ref 7–18)
BUN/CREAT SERPL: 14 (ref 12–20)
CALCIUM SERPL-MCNC: 8.9 MG/DL (ref 8.5–10.1)
CHLORIDE SERPL-SCNC: 109 MMOL/L (ref 100–111)
CO2 SERPL-SCNC: 28 MMOL/L (ref 21–32)
CREAT SERPL-MCNC: 0.86 MG/DL (ref 0.6–1.3)
DIFFERENTIAL METHOD BLD: NORMAL
EOSINOPHIL # BLD: 0.1 K/UL (ref 0–0.4)
EOSINOPHIL NFR BLD: 1 % (ref 0–5)
ERYTHROCYTE [DISTWIDTH] IN BLOOD BY AUTOMATED COUNT: 13.1 % (ref 11.6–14.5)
ETHANOL SERPL-MCNC: 65 MG/DL (ref 0–3)
GLOBULIN SER CALC-MCNC: 3.2 G/DL (ref 2–4)
GLUCOSE SERPL-MCNC: 78 MG/DL (ref 74–99)
HCT VFR BLD AUTO: 39.8 % (ref 36–48)
HGB BLD-MCNC: 14.2 G/DL (ref 13–16)
IMM GRANULOCYTES # BLD AUTO: 0 K/UL (ref 0–0.04)
IMM GRANULOCYTES NFR BLD AUTO: 0 % (ref 0–0.5)
LYMPHOCYTES # BLD: 1.8 K/UL (ref 0.9–3.6)
LYMPHOCYTES NFR BLD: 25 % (ref 21–52)
MCH RBC QN AUTO: 33.3 PG (ref 24–34)
MCHC RBC AUTO-ENTMCNC: 35.7 G/DL (ref 31–37)
MCV RBC AUTO: 93.4 FL (ref 78–100)
MONOCYTES # BLD: 0.6 K/UL (ref 0.05–1.2)
MONOCYTES NFR BLD: 9 % (ref 3–10)
NEUTS SEG # BLD: 4.8 K/UL (ref 1.8–8)
NEUTS SEG NFR BLD: 65 % (ref 40–73)
NRBC # BLD: 0 K/UL (ref 0–0.01)
NRBC BLD-RTO: 0 PER 100 WBC
PLATELET # BLD AUTO: 235 K/UL (ref 135–420)
PMV BLD AUTO: 9.5 FL (ref 9.2–11.8)
POTASSIUM SERPL-SCNC: 4.2 MMOL/L (ref 3.5–5.5)
PROT SERPL-MCNC: 6.3 G/DL (ref 6.4–8.2)
RBC # BLD AUTO: 4.26 M/UL (ref 4.35–5.65)
SODIUM SERPL-SCNC: 141 MMOL/L (ref 136–145)
WBC # BLD AUTO: 7.4 K/UL (ref 4.6–13.2)

## 2022-07-05 PROCEDURE — 96374 THER/PROPH/DIAG INJ IV PUSH: CPT

## 2022-07-05 PROCEDURE — 90791 PSYCH DIAGNOSTIC EVALUATION: CPT

## 2022-07-05 PROCEDURE — 82077 ASSAY SPEC XCP UR&BREATH IA: CPT

## 2022-07-05 PROCEDURE — 80053 COMPREHEN METABOLIC PANEL: CPT

## 2022-07-05 PROCEDURE — 99285 EMERGENCY DEPT VISIT HI MDM: CPT

## 2022-07-05 PROCEDURE — 85027 COMPLETE CBC AUTOMATED: CPT

## 2022-07-05 PROCEDURE — 80307 DRUG TEST PRSMV CHEM ANLYZR: CPT

## 2022-07-05 PROCEDURE — 85025 COMPLETE CBC W/AUTO DIFF WBC: CPT

## 2022-07-05 RX ORDER — LORAZEPAM 2 MG/ML
3 INJECTION, SOLUTION INTRAMUSCULAR; INTRAVENOUS
Status: DISCONTINUED | OUTPATIENT
Start: 2022-07-05 | End: 2022-07-06 | Stop reason: HOSPADM

## 2022-07-05 RX ORDER — LORAZEPAM 1 MG/1
1 TABLET ORAL
Status: DISCONTINUED | OUTPATIENT
Start: 2022-07-05 | End: 2022-07-06 | Stop reason: HOSPADM

## 2022-07-05 RX ORDER — LORAZEPAM 1 MG/1
2 TABLET ORAL
Status: DISCONTINUED | OUTPATIENT
Start: 2022-07-05 | End: 2022-07-06 | Stop reason: HOSPADM

## 2022-07-05 RX ORDER — SODIUM CHLORIDE 0.9 % (FLUSH) 0.9 %
5-40 SYRINGE (ML) INJECTION AS NEEDED
Status: DISCONTINUED | OUTPATIENT
Start: 2022-07-05 | End: 2022-07-06 | Stop reason: HOSPADM

## 2022-07-05 RX ORDER — SODIUM CHLORIDE 0.9 % (FLUSH) 0.9 %
5-40 SYRINGE (ML) INJECTION EVERY 8 HOURS
Status: DISCONTINUED | OUTPATIENT
Start: 2022-07-05 | End: 2022-07-06 | Stop reason: HOSPADM

## 2022-07-05 RX ORDER — LORAZEPAM 2 MG/ML
1 INJECTION, SOLUTION INTRAMUSCULAR; INTRAVENOUS
Status: DISCONTINUED | OUTPATIENT
Start: 2022-07-05 | End: 2022-07-06 | Stop reason: HOSPADM

## 2022-07-05 RX ORDER — LORAZEPAM 2 MG/ML
2 INJECTION, SOLUTION INTRAMUSCULAR; INTRAVENOUS
Status: DISCONTINUED | OUTPATIENT
Start: 2022-07-05 | End: 2022-07-06 | Stop reason: HOSPADM

## 2022-07-05 NOTE — ED NOTES
Patient was seen briefly while on arrival to the patient's room and the patient was told he needed to change he describes and then decided to leave. The patient was not suicidal or homicidal and alcohol level was 65. Will evaluate the patient if he should return.

## 2022-07-05 NOTE — ED TRIAGE NOTES
Pt states he is trying to get into a rehab facility, but he needs to detox from etoh and cocaine first. Pt's last drink was a few hours ago, pt states, \"I'm drunk right now. \" Pt states \"I've been having a hard time since my daughter  a few months ago. \" Pt states \"I have a program that has a bed for me, I just need to detox. \" The program is [facility]. Pt has feelings of wanting to be dead or not wake up, but no actual thoughts of killing himself or plan to do so.

## 2022-07-05 NOTE — ED NOTES
Patient agitated on arrival to room, asked to change into paper scrubs per policy patient refusing, states he will just leave. Patient left ED, charge RN and security aware.

## 2022-07-06 ENCOUNTER — HOSPITAL ENCOUNTER (EMERGENCY)
Age: 52
Discharge: HOME | End: 2022-07-07
Attending: STUDENT IN AN ORGANIZED HEALTH CARE EDUCATION/TRAINING PROGRAM
Payer: COMMERCIAL

## 2022-07-06 VITALS
DIASTOLIC BLOOD PRESSURE: 53 MMHG | HEART RATE: 79 BPM | OXYGEN SATURATION: 97 % | TEMPERATURE: 97.8 F | WEIGHT: 150 LBS | SYSTOLIC BLOOD PRESSURE: 103 MMHG | RESPIRATION RATE: 18 BRPM | BODY MASS INDEX: 19.88 KG/M2 | HEIGHT: 73 IN

## 2022-07-06 DIAGNOSIS — F10.10 ALCOHOL ABUSE: Primary | ICD-10-CM

## 2022-07-06 DIAGNOSIS — F14.90 COCAINE USE: ICD-10-CM

## 2022-07-06 DIAGNOSIS — Z53.21 ELOPED FROM EMERGENCY DEPARTMENT: ICD-10-CM

## 2022-07-06 LAB
ALBUMIN SERPL-MCNC: 3.2 G/DL (ref 3.4–5)
ALBUMIN SERPL-MCNC: 3.3 G/DL (ref 3.4–5)
ALBUMIN/GLOB SERPL: 1.1 {RATIO} (ref 0.8–1.7)
ALBUMIN/GLOB SERPL: 1.2 {RATIO} (ref 0.8–1.7)
ALP SERPL-CCNC: 105 U/L (ref 45–117)
ALP SERPL-CCNC: 114 U/L (ref 45–117)
ALT SERPL-CCNC: 25 U/L (ref 16–61)
ALT SERPL-CCNC: 28 U/L (ref 16–61)
AMPHET UR QL SCN: NEGATIVE
AMPHET UR QL SCN: NEGATIVE
ANION GAP SERPL CALC-SCNC: 4 MMOL/L (ref 3–18)
ANION GAP SERPL CALC-SCNC: 7 MMOL/L (ref 3–18)
AST SERPL-CCNC: 26 U/L (ref 10–38)
AST SERPL-CCNC: 29 U/L (ref 10–38)
BARBITURATES UR QL SCN: NEGATIVE
BARBITURATES UR QL SCN: NEGATIVE
BASOPHILS # BLD: 0 K/UL (ref 0–0.1)
BASOPHILS # BLD: 0.1 K/UL (ref 0–0.1)
BASOPHILS NFR BLD: 1 % (ref 0–2)
BASOPHILS NFR BLD: 1 % (ref 0–2)
BENZODIAZ UR QL: NEGATIVE
BENZODIAZ UR QL: NEGATIVE
BILIRUB SERPL-MCNC: 0.5 MG/DL (ref 0.2–1)
BILIRUB SERPL-MCNC: 0.7 MG/DL (ref 0.2–1)
BUN SERPL-MCNC: 10 MG/DL (ref 7–18)
BUN SERPL-MCNC: 15 MG/DL (ref 7–18)
BUN/CREAT SERPL: 10 (ref 12–20)
BUN/CREAT SERPL: 16 (ref 12–20)
CALCIUM SERPL-MCNC: 8.9 MG/DL (ref 8.5–10.1)
CALCIUM SERPL-MCNC: 9.2 MG/DL (ref 8.5–10.1)
CANNABINOIDS UR QL SCN: NEGATIVE
CANNABINOIDS UR QL SCN: NEGATIVE
CHLORIDE SERPL-SCNC: 108 MMOL/L (ref 100–111)
CHLORIDE SERPL-SCNC: 110 MMOL/L (ref 100–111)
CO2 SERPL-SCNC: 26 MMOL/L (ref 21–32)
CO2 SERPL-SCNC: 29 MMOL/L (ref 21–32)
COCAINE UR QL SCN: POSITIVE
COCAINE UR QL SCN: POSITIVE
CREAT SERPL-MCNC: 0.96 MG/DL (ref 0.6–1.3)
CREAT SERPL-MCNC: 1.01 MG/DL (ref 0.6–1.3)
DIFFERENTIAL METHOD BLD: ABNORMAL
DIFFERENTIAL METHOD BLD: ABNORMAL
EOSINOPHIL # BLD: 0.1 K/UL (ref 0–0.4)
EOSINOPHIL # BLD: 0.1 K/UL (ref 0–0.4)
EOSINOPHIL NFR BLD: 1 % (ref 0–5)
EOSINOPHIL NFR BLD: 2 % (ref 0–5)
ERYTHROCYTE [DISTWIDTH] IN BLOOD BY AUTOMATED COUNT: 13.1 % (ref 11.6–14.5)
ERYTHROCYTE [DISTWIDTH] IN BLOOD BY AUTOMATED COUNT: 13.4 % (ref 11.6–14.5)
ETHANOL SERPL-MCNC: <3 MG/DL (ref 0–3)
ETHANOL SERPL-MCNC: <3 MG/DL (ref 0–3)
FLUAV RNA SPEC QL NAA+PROBE: NOT DETECTED
FLUBV RNA SPEC QL NAA+PROBE: NOT DETECTED
GLOBULIN SER CALC-MCNC: 2.8 G/DL (ref 2–4)
GLOBULIN SER CALC-MCNC: 2.8 G/DL (ref 2–4)
GLUCOSE SERPL-MCNC: 130 MG/DL (ref 74–99)
GLUCOSE SERPL-MCNC: 82 MG/DL (ref 74–99)
HCT VFR BLD AUTO: 40.4 % (ref 36–48)
HCT VFR BLD AUTO: 41.3 % (ref 36–48)
HDSCOM,HDSCOM: ABNORMAL
HDSCOM,HDSCOM: ABNORMAL
HGB BLD-MCNC: 13.9 G/DL (ref 13–16)
HGB BLD-MCNC: 14.6 G/DL (ref 13–16)
IMM GRANULOCYTES # BLD AUTO: 0 K/UL (ref 0–0.04)
IMM GRANULOCYTES # BLD AUTO: 0 K/UL (ref 0–0.04)
IMM GRANULOCYTES NFR BLD AUTO: 0 % (ref 0–0.5)
IMM GRANULOCYTES NFR BLD AUTO: 0 % (ref 0–0.5)
LYMPHOCYTES # BLD: 1.3 K/UL (ref 0.9–3.6)
LYMPHOCYTES # BLD: 1.5 K/UL (ref 0.9–3.6)
LYMPHOCYTES NFR BLD: 15 % (ref 21–52)
LYMPHOCYTES NFR BLD: 20 % (ref 21–52)
MCH RBC QN AUTO: 32.6 PG (ref 24–34)
MCH RBC QN AUTO: 33.3 PG (ref 24–34)
MCHC RBC AUTO-ENTMCNC: 34.4 G/DL (ref 31–37)
MCHC RBC AUTO-ENTMCNC: 35.4 G/DL (ref 31–37)
MCV RBC AUTO: 94.1 FL (ref 78–100)
MCV RBC AUTO: 94.8 FL (ref 78–100)
METHADONE UR QL: NEGATIVE
METHADONE UR QL: NEGATIVE
MONOCYTES # BLD: 0.6 K/UL (ref 0.05–1.2)
MONOCYTES # BLD: 0.7 K/UL (ref 0.05–1.2)
MONOCYTES NFR BLD: 8 % (ref 3–10)
MONOCYTES NFR BLD: 8 % (ref 3–10)
NEUTS SEG # BLD: 5.3 K/UL (ref 1.8–8)
NEUTS SEG # BLD: 6.3 K/UL (ref 1.8–8)
NEUTS SEG NFR BLD: 70 % (ref 40–73)
NEUTS SEG NFR BLD: 75 % (ref 40–73)
NRBC # BLD: 0 K/UL (ref 0–0.01)
NRBC # BLD: 0 K/UL (ref 0–0.01)
NRBC BLD-RTO: 0 PER 100 WBC
NRBC BLD-RTO: 0 PER 100 WBC
OPIATES UR QL: NEGATIVE
OPIATES UR QL: NEGATIVE
PCP UR QL: NEGATIVE
PCP UR QL: NEGATIVE
PLATELET # BLD AUTO: 234 K/UL (ref 135–420)
PLATELET # BLD AUTO: 235 K/UL (ref 135–420)
PMV BLD AUTO: 9.5 FL (ref 9.2–11.8)
PMV BLD AUTO: 9.8 FL (ref 9.2–11.8)
POTASSIUM SERPL-SCNC: 3.8 MMOL/L (ref 3.5–5.5)
POTASSIUM SERPL-SCNC: 4.1 MMOL/L (ref 3.5–5.5)
PROT SERPL-MCNC: 6 G/DL (ref 6.4–8.2)
PROT SERPL-MCNC: 6.1 G/DL (ref 6.4–8.2)
RBC # BLD AUTO: 4.26 M/UL (ref 4.35–5.65)
RBC # BLD AUTO: 4.39 M/UL (ref 4.35–5.65)
SARS-COV-2, COV2: NOT DETECTED
SODIUM SERPL-SCNC: 141 MMOL/L (ref 136–145)
SODIUM SERPL-SCNC: 143 MMOL/L (ref 136–145)
WBC # BLD AUTO: 7.7 K/UL (ref 4.6–13.2)
WBC # BLD AUTO: 8.4 K/UL (ref 4.6–13.2)

## 2022-07-06 PROCEDURE — 87636 SARSCOV2 & INF A&B AMP PRB: CPT

## 2022-07-06 PROCEDURE — 82077 ASSAY SPEC XCP UR&BREATH IA: CPT

## 2022-07-06 PROCEDURE — 80053 COMPREHEN METABOLIC PANEL: CPT

## 2022-07-06 PROCEDURE — 99285 EMERGENCY DEPT VISIT HI MDM: CPT

## 2022-07-06 PROCEDURE — 74011250637 HC RX REV CODE- 250/637: Performed by: STUDENT IN AN ORGANIZED HEALTH CARE EDUCATION/TRAINING PROGRAM

## 2022-07-06 PROCEDURE — 80307 DRUG TEST PRSMV CHEM ANLYZR: CPT

## 2022-07-06 PROCEDURE — 85025 COMPLETE CBC W/AUTO DIFF WBC: CPT

## 2022-07-06 RX ADMIN — LORAZEPAM 1 MG: 1 TABLET ORAL at 02:47

## 2022-07-06 RX ADMIN — LORAZEPAM 1 MG: 1 TABLET ORAL at 00:04

## 2022-07-06 NOTE — ED PROVIDER NOTES
EMERGENCY DEPARTMENT HISTORY AND PHYSICAL EXAM    11:35 PM    Date: 7/5/2022  Patient Name: Solomon Crigler    History of Presenting Illness     Chief Complaint   Patient presents with    Alcohol Problem     Alchohol and cocaine withdraw       History Provided By: Patient  Location/Duration/Severity/Modifying factors   HPI   Solomon Crigler is a 47 y.o. male with past medical history of schizophrenia, ADHD, polysubstance abuse, alcohol abuse presenting for evaluation of detox. Patient was in the emergency department earlier today but left to go smoke cocaine. He comes back today requesting detox from both alcohol and cocaine. He says that he drinks about 2/5 of liquor per day as well as about 6 beers. In the past he has had the shakes but never any seizures. He has no SI, HI hallucinations. No medical complaints today. No other alleviating or aggravating factors. Overall he feels that his addiction is severe. PCP: None    Current Facility-Administered Medications   Medication Dose Route Frequency Provider Last Rate Last Admin    sodium chloride (NS) flush 5-40 mL  5-40 mL IntraVENous Q8H Sydnie Snow MD        sodium chloride (NS) flush 5-40 mL  5-40 mL IntraVENous PRN Sydnie Snow MD        LORazepam (ATIVAN) tablet 1 mg  1 mg Oral Q1H PRN Sydnie Snow MD   1 mg at 07/06/22 0247    Or    LORazepam (ATIVAN) 2 mg/mL injection 1 mg  1 mg IntraVENous Q1H PRRAMIRO Snow MD        LORazepam (ATIVAN) tablet 2 mg  2 mg Oral Q1H PRN Sydnie Snow MD        Or    LORazepam (ATIVAN) 2 mg/mL injection 2 mg  2 mg IntraVENous Q1H PRRAMIRO Snow MD        LORazepam (ATIVAN) 2 mg/mL injection 3 mg  3 mg IntraVENous Q15MIN PRRAMIRO Snow MD         Current Outpatient Medications   Medication Sig Dispense Refill    ibuprofen (MOTRIN) 600 mg tablet Take 1 Tablet by mouth every six (6) hours as needed for Pain.  20 Tablet 0    lidocaine (Lidoderm) 5 % Apply patch to the affected area for 12 hours a day and remove for 12 hours a day. 14 Each 0    acetaminophen (TYLENOL) 325 mg tablet Take 2 Tablets by mouth every four (4) hours as needed for Pain. 20 Tablet 0    hydrOXYzine HCl (ATARAX) 50 mg tablet Take 100 mg by mouth daily.  raNITIdine (ZANTAC) 150 mg tablet Take 150 mg by mouth daily.  benztropine (COGENTIN) 1 mg tablet Take 1 mg by mouth daily.  lithium carbonate 300 mg capsule Take 600 mg by mouth nightly.  haloperidol (HALDOL) 20 mg tablet Take 20 mg by mouth.  OLANZapine (ZYPREXA) 10 mg tablet Take 1 Tab by mouth nightly. Indications: Schizoaffective Disorder 30 Tab 0       Past History     Past Medical History:  Past Medical History:   Diagnosis Date    ADHD     B[facility] affective ([facility] [address]     Cocaine use 05/2018    uds positive    Schizophrenia ([facility] [address]     Smoker        Past Surgical History:  No past surgical history on file. Family History:  Family History   Adopted: Yes       Social History:  Social History     Tobacco Use    Smoking status: Current Every Day Smoker     Packs/day: 1.00    Smokeless tobacco: Never Used   Substance Use Topics    Alcohol use: Yes     Comment: gallon a day    Drug use: Yes     Types: Cocaine       Allergies:  No Known Allergies    I reviewed and confirmed the above information with patient and updated as necessary. Review of Systems     Review of Systems   Constitutional: Negative for diaphoresis and fever. HENT: Negative for ear pain and sore throat. Eyes:        No acute change in vision   Respiratory: Negative for cough and shortness of breath. Cardiovascular: Negative for chest pain and leg swelling. Gastrointestinal: Negative for abdominal pain and vomiting. Genitourinary: Negative for dysuria. Musculoskeletal: Negative for neck pain. Skin: Negative for wound. Neurological: Negative for weakness and headaches.    Psychiatric/Behavioral: Negative for sleep disturbance and suicidal ideas.        Substance abuse       Physical Exam     Visit Vitals  /84   Pulse (!) 101   Temp 97.6 °F (36.4 °C)   Resp 18   Ht 6' 1\" (1.854 m)   Wt 68 kg (150 lb)   SpO2 100%   BMI 19.79 kg/m²       Physical Exam  Vitals and nursing note reviewed. Constitutional:       Appearance: Normal appearance. He is not ill-appearing. Comments: Adult male resting comfortably, cooperative, calm   HENT:      Mouth/Throat:      Mouth: Mucous membranes are moist.   Eyes:      Pupils: Pupils are equal, round, and reactive to light. Cardiovascular:      Rate and Rhythm: Normal rate and regular rhythm. Pulses: Normal pulses. Heart sounds: Normal heart sounds. Pulmonary:      Effort: Pulmonary effort is normal.      Breath sounds: Normal breath sounds. Abdominal:      General: Abdomen is flat. Tenderness: There is no abdominal tenderness. Musculoskeletal:         General: No swelling. Normal range of motion. Cervical back: Normal range of motion. Skin:     General: Skin is warm. Neurological:      General: No focal deficit present. Mental Status: He is alert and oriented to person, place, and time. Diagnostic Study Results     Labs -  Recent Results (from the past 24 hour(s))   METABOLIC PANEL, COMPREHENSIVE    Collection Time: 07/05/22  7:05 PM   Result Value Ref Range    Sodium 141 136 - 145 mmol/L    Potassium 4.2 3.5 - 5.5 mmol/L    Chloride 109 100 - 111 mmol/L    CO2 28 21 - 32 mmol/L    Anion gap 4 3.0 - 18 mmol/L    Glucose 78 74 - 99 mg/dL    BUN 12 7.0 - 18 MG/DL    Creatinine 0.86 0.6 - 1.3 MG/DL    BUN/Creatinine ratio 14 12 - 20      GFR est AA >60 >60 ml/min/1.73m2    GFR est non-AA >60 >60 ml/min/1.73m2    Calcium 8.9 8.5 - 10.1 MG/DL    Bilirubin, total 0.5 0.2 - 1.0 MG/DL    ALT (SGPT) 30 16 - 61 U/L    AST (SGOT) 27 10 - 38 U/L    Alk.  phosphatase 113 45 - 117 U/L    Protein, total 6.3 (L) 6.4 - 8.2 g/dL    Albumin 3.1 (L) 3.4 - 5.0 g/dL    Globulin 3.2 2.0 - 4.0 g/dL    A-G Ratio 1.0 0.8 - 1.7     ETHYL ALCOHOL    Collection Time: 07/05/22  7:05 PM   Result Value Ref Range    ALCOHOL(ETHYL),SERUM 65 (H) 0 - 3 MG/DL   CBC W/O DIFF    Collection Time: 07/05/22  7:12 PM   Result Value Ref Range    WBC 7.4 4.6 - 13.2 K/uL    RBC 4.26 (L) 4.35 - 5.65 M/uL    HGB 14.2 13.0 - 16.0 g/dL    HCT 39.8 36.0 - 48.0 %    MCV 93.4 78.0 - 100.0 FL    MCH 33.3 24.0 - 34.0 PG    MCHC 35.7 31.0 - 37.0 g/dL    RDW 13.1 11.6 - 14.5 %    PLATELET 295 379 - 290 K/uL    MPV 9.5 9.2 - 11.8 FL    NRBC 0.0 0  WBC    ABSOLUTE NRBC 0.00 0.00 - 0.01 K/uL   DIFFERENTIAL, AUTO    Collection Time: 07/05/22  7:12 PM   Result Value Ref Range    NEUTROPHILS 65 40 - 73 %    LYMPHOCYTES 25 21 - 52 %    MONOCYTES 9 3 - 10 %    EOSINOPHILS 1 0 - 5 %    BASOPHILS 1 0 - 2 %    IMMATURE GRANULOCYTES 0 0.0 - 0.5 %    ABS. NEUTROPHILS 4.8 1.8 - 8.0 K/UL    ABS. LYMPHOCYTES 1.8 0.9 - 3.6 K/UL    ABS. MONOCYTES 0.6 0.05 - 1.2 K/UL    ABS. EOSINOPHILS 0.1 0.0 - 0.4 K/UL    ABS. BASOPHILS 0.0 0.0 - 0.1 K/UL    ABS. IMM. GRANS. 0.0 0.00 - 0.04 K/UL    DF AUTOMATED     CBC WITH AUTOMATED DIFF    Collection Time: 07/05/22 11:51 PM   Result Value Ref Range    WBC 8.4 4.6 - 13.2 K/uL    RBC 4.39 4.35 - 5.65 M/uL    HGB 14.6 13.0 - 16.0 g/dL    HCT 41.3 36.0 - 48.0 %    MCV 94.1 78.0 - 100.0 FL    MCH 33.3 24.0 - 34.0 PG    MCHC 35.4 31.0 - 37.0 g/dL    RDW 13.1 11.6 - 14.5 %    PLATELET 802 694 - 677 K/uL    MPV 9.5 9.2 - 11.8 FL    NRBC 0.0 0  WBC    ABSOLUTE NRBC 0.00 0.00 - 0.01 K/uL    NEUTROPHILS 75 (H) 40 - 73 %    LYMPHOCYTES 15 (L) 21 - 52 %    MONOCYTES 8 3 - 10 %    EOSINOPHILS 1 0 - 5 %    BASOPHILS 1 0 - 2 %    IMMATURE GRANULOCYTES 0 0.0 - 0.5 %    ABS. NEUTROPHILS 6.3 1.8 - 8.0 K/UL    ABS. LYMPHOCYTES 1.3 0.9 - 3.6 K/UL    ABS. MONOCYTES 0.7 0.05 - 1.2 K/UL    ABS. EOSINOPHILS 0.1 0.0 - 0.4 K/UL    ABS. BASOPHILS 0.0 0.0 - 0.1 K/UL    ABS. IMM.  GRANS. 0.0 0.00 - 0.04 K/UL    DF AUTOMATED METABOLIC PANEL, COMPREHENSIVE    Collection Time: 07/05/22 11:51 PM   Result Value Ref Range    Sodium 143 136 - 145 mmol/L    Potassium 3.8 3.5 - 5.5 mmol/L    Chloride 110 100 - 111 mmol/L    CO2 29 21 - 32 mmol/L    Anion gap 4 3.0 - 18 mmol/L    Glucose 82 74 - 99 mg/dL    BUN 10 7.0 - 18 MG/DL    Creatinine 1.01 0.6 - 1.3 MG/DL    BUN/Creatinine ratio 10 (L) 12 - 20      GFR est AA >60 >60 ml/min/1.73m2    GFR est non-AA >60 >60 ml/min/1.73m2    Calcium 8.9 8.5 - 10.1 MG/DL    Bilirubin, total 0.5 0.2 - 1.0 MG/DL    ALT (SGPT) 28 16 - 61 U/L    AST (SGOT) 29 10 - 38 U/L    Alk. phosphatase 105 45 - 117 U/L    Protein, total 6.1 (L) 6.4 - 8.2 g/dL    Albumin 3.3 (L) 3.4 - 5.0 g/dL    Globulin 2.8 2.0 - 4.0 g/dL    A-G Ratio 1.2 0.8 - 1.7     ETHYL ALCOHOL    Collection Time: 07/05/22 11:51 PM   Result Value Ref Range    ALCOHOL(ETHYL),SERUM <3 0 - 3 MG/DL   DRUG SCREEN, URINE    Collection Time: 07/05/22 11:55 PM   Result Value Ref Range    BENZODIAZEPINES Negative NEG      BARBITURATES Negative NEG      THC (TH-CANNABINOL) Negative NEG      OPIATES Negative NEG      PCP(PHENCYCLIDINE) Negative NEG      COCAINE Positive (A) NEG      AMPHETAMINES Negative NEG      METHADONE Negative NEG      HDSCOM (NOTE)          Radiologic Studies -   No orders to display           Medical Decision Making   I am the first provider for this patient. I reviewed the vital signs, available nursing notes, past medical history, past surgical history, family history and social history. Vital Signs-Reviewed the patient's vital signs.     Records Reviewed: Nursing Notes and Old Medical Records (Time of Review: 11:35 PM)    Provider Notes (Medical Decision Making):   MDM  21-year-old male here for request for detox from alcohol, cocaine, polysubstance abuse consider withdrawal    ED Course: Progress Notes, Reevaluation, and Consults:  Patient arrives afebrile, slight tachycardic but otherwise hemodynamically normal  Rest comfortably, nondistressed    We will screen mental health labs, placed on [facility] protocol. CBC and CMP are unremarkable  Ethanol negative  UDS positive for cocaine    Patient is medically cleared for psychiatric evaluation, discussed with crisis who will see and evaluate the patient. Patient was signed out to Dr. Kapil Tabares, appreciate his assistance. Procedures    Diagnosis     Clinical Impression:   1. Alcohol abuse    2. Cocaine abuse ([address]        Disposition: TBD    Follow-up Information    None          Patient's Medications   Start Taking    No medications on file   Continue Taking    ACETAMINOPHEN (TYLENOL) 325 MG TABLET    Take 2 Tablets by mouth every four (4) hours as needed for Pain. BENZTROPINE (COGENTIN) 1 MG TABLET    Take 1 mg by mouth daily. HALOPERIDOL (HALDOL) 20 MG TABLET    Take 20 mg by mouth. HYDROXYZINE HCL (ATARAX) 50 MG TABLET    Take 100 mg by mouth daily. IBUPROFEN (MOTRIN) 600 MG TABLET    Take 1 Tablet by mouth every six (6) hours as needed for Pain. LIDOCAINE (LIDODERM) 5 %    Apply patch to the affected area for 12 hours a day and remove for 12 hours a day. LITHIUM CARBONATE 300 MG CAPSULE    Take 600 mg by mouth nightly. OLANZAPINE (ZYPREXA) 10 MG TABLET    Take 1 Tab by mouth nightly. Indications: Schizoaffective Disorder    RANITIDINE (ZANTAC) 150 MG TABLET    Take 150 mg by mouth daily. These Medications have changed    No medications on file   Stop Taking    No medications on file       Nehemiah Rodríguez MD   Emergency Medicine   July 6, 2022, 11:35 PM     This note is dictated utilizing Dragon voice recognition software. Unfortunately this leads to occasional typographical errors using the voice recognition. I apologize in advance if the situation occurs. If questions occur please do not hesitate to contact me directly.     Vincent Morton MD

## 2022-07-06 NOTE — BSMART NOTE
Crisis Note: On rounds, Patient was reassessed. Patient is still receptive to inpatient treatment for detox. Sitter at bedside. Crisis will continue to assist as needed.

## 2022-07-06 NOTE — BSMART NOTE
Crisis Note: Bed Search Update     [facility]: Clinicals faxed for review     [facility]: Denied due to hx dx of schizoaffective disorder

## 2022-07-06 NOTE — BSMART NOTE
Crisis Note: Bed Search Update    [facility]: Clinicals faxed for review    [facility]: Clinicals faxed for review

## 2022-07-06 NOTE — ED NOTES
6:00 PM Assumed care of the pt at this time. Discussed with MICHELE Hernandez concerning patient Radha Steinberg, standard discussion of reason for visit, HPI, ROS, PE, and current results available. Pt pending crisis evaluation/placement. Will continue to monitor while in the ED awaiting placement. Julienne Andersen PA-C     1:44 AM crisis presented to the ED to evaluate. Pt has eloped from the ED. Julienne Andersen PA-C     Disposition: eloped    Dictation disclaimer:  Please note that this dictation was completed with [employer], the computer voice recognition software. Quite often unanticipated grammatical, syntax, homophones, and other interpretive errors are inadvertently transcribed by the computer software. Please disregard these errors. Please excuse any errors that have escaped final proofreading.

## 2022-07-06 NOTE — ED TRIAGE NOTES
Pt arrives for ETOH detox, pt is trying to get into [facility], pt is here with his counselor. Pt states \"I'm not leaving this time, I'm here and ready to do what needs to be done. \" Pt denies SI/HI and abuse.

## 2022-07-06 NOTE — ED PROVIDER NOTES
EMERGENCY DEPARTMENT HISTORY AND PHYSICAL EXAM    4:23 PM      Date: 7/6/2022  Patient Name: Yvonne Alvarado    History of Presenting Illness     Chief Complaint   Patient presents with    Drug Problem         History Provided By: Patient, Rosemary Chin (mental health counselor)    Additional History (Context): Yvonne Alvarado is a 47 y.o. male with hx of bipolar d/o, schizophrenia, alcohol and cocaine use who presents to the ED for alcohol and cocaine detox. Pt notes he was evaluated earlier in the ED and eloped because he needed to contact his . Patient returns to the ED with his mental health counselor and notes \"I am willing to stay this time \". Patient is he drinks about 2/5 of liquor per day, notes last intake was yesterday. Notes last cocaine use was earlier this morning. Patient denies SI, HI, visual or auditory hallucinations. PCP: None    Current Outpatient Medications   Medication Sig Dispense Refill    ibuprofen (MOTRIN) 600 mg tablet Take 1 Tablet by mouth every six (6) hours as needed for Pain. 20 Tablet 0    lidocaine (Lidoderm) 5 % Apply patch to the affected area for 12 hours a day and remove for 12 hours a day. 14 Each 0    acetaminophen (TYLENOL) 325 mg tablet Take 2 Tablets by mouth every four (4) hours as needed for Pain. 20 Tablet 0    hydrOXYzine HCl (ATARAX) 50 mg tablet Take 100 mg by mouth daily.  raNITIdine (ZANTAC) 150 mg tablet Take 150 mg by mouth daily.  benztropine (COGENTIN) 1 mg tablet Take 1 mg by mouth daily.  lithium carbonate 300 mg capsule Take 600 mg by mouth nightly.  haloperidol (HALDOL) 20 mg tablet Take 20 mg by mouth.  OLANZapine (ZYPREXA) 10 mg tablet Take 1 Tab by mouth nightly.  Indications: Schizoaffective Disorder 30 Tab 0       Past History     Past Medical History:  Past Medical History:   Diagnosis Date    ADHD     [facility]ipolar affective [facility] [address]     Cocaine use 05/2018    uds positive    Schizophrenia ([facility] [address]     Smoker        Past Surgical History:  No past surgical history on file. Family History:  Family History   Adopted: Yes       Social History:  Social History     Tobacco Use    Smoking status: Current Every Day Smoker     Packs/day: 1.00    Smokeless tobacco: Never Used   Substance Use Topics    Alcohol use: Yes     Comment: gallon a day    Drug use: Yes     Types: Cocaine       Allergies:  No Known Allergies      Review of Systems       Review of Systems   Constitutional: Negative for chills and fever. Respiratory: Negative for shortness of breath. Cardiovascular: Negative for chest pain. Gastrointestinal: Negative for abdominal pain, nausea and vomiting. Skin: Negative for rash. Neurological: Negative for weakness. All other systems reviewed and are negative. Physical Exam     Visit Vitals  /79 (BP 1 Location: Left upper arm, BP Patient Position: At rest)   Pulse 89   Temp 98.2 °F (36.8 °C)   Resp 18   Ht 6' 1\" (1.854 m)   Wt 68 kg (150 lb)   SpO2 98%   BMI 19.79 kg/m²         Physical Exam  Vitals and nursing note reviewed. Constitutional:       General: He is not in acute distress. Appearance: He is well-developed. He is not ill-appearing, toxic-appearing or diaphoretic. HENT:      Head: Normocephalic and atraumatic. Cardiovascular:      Rate and Rhythm: Normal rate and regular rhythm. Heart sounds: Normal heart sounds. No murmur heard. No friction rub. No gallop. Pulmonary:      Effort: Pulmonary effort is normal. No respiratory distress. Breath sounds: Normal breath sounds. No wheezing or rales. Musculoskeletal:         General: Normal range of motion. Cervical back: Normal range of motion and neck supple. Skin:     General: Skin is warm. Findings: No rash. Neurological:      General: No focal deficit present. Mental Status: He is alert and oriented to person, place, and time.    Psychiatric:         Attention and Perception: Attention normal.         Mood and Affect: Mood normal.         Speech: Speech normal.         Behavior: Behavior normal.         Thought Content: Thought content normal.           Diagnostic Study Results     Labs -  Recent Results (from the past 12 hour(s))   CBC WITH AUTOMATED DIFF    Collection Time: 07/06/22  4:15 PM   Result Value Ref Range    WBC 7.7 4.6 - 13.2 K/uL    RBC 4.26 (L) 4.35 - 5.65 M/uL    HGB 13.9 13.0 - 16.0 g/dL    HCT 40.4 36.0 - 48.0 %    MCV 94.8 78.0 - 100.0 FL    MCH 32.6 24.0 - 34.0 PG    MCHC 34.4 31.0 - 37.0 g/dL    RDW 13.4 11.6 - 14.5 %    PLATELET 304 097 - 448 K/uL    MPV 9.8 9.2 - 11.8 FL    NRBC 0.0 0  WBC    ABSOLUTE NRBC 0.00 0.00 - 0.01 K/uL    NEUTROPHILS 70 40 - 73 %    LYMPHOCYTES 20 (L) 21 - 52 %    MONOCYTES 8 3 - 10 %    EOSINOPHILS 2 0 - 5 %    BASOPHILS 1 0 - 2 %    IMMATURE GRANULOCYTES 0 0.0 - 0.5 %    ABS. NEUTROPHILS 5.3 1.8 - 8.0 K/UL    ABS. LYMPHOCYTES 1.5 0.9 - 3.6 K/UL    ABS. MONOCYTES 0.6 0.05 - 1.2 K/UL    ABS. EOSINOPHILS 0.1 0.0 - 0.4 K/UL    ABS. BASOPHILS 0.1 0.0 - 0.1 K/UL    ABS. IMM. GRANS. 0.0 0.00 - 0.04 K/UL    DF AUTOMATED     DRUG SCREEN, URINE    Collection Time: 07/06/22  4:15 PM   Result Value Ref Range    BENZODIAZEPINES Negative NEG      BARBITURATES Negative NEG      THC (TH-CANNABINOL) Negative NEG      OPIATES Negative NEG      PCP(PHENCYCLIDINE) Negative NEG      COCAINE Positive (A) NEG      AMPHETAMINES Negative NEG      METHADONE Negative NEG      HDSCOM (NOTE)        Radiologic Studies -   No orders to display         Medical Decision Making   I am the first provider for this patient. I reviewed the vital signs, available nursing notes, past medical history, past surgical history, family history and social history. Vital Signs-Reviewed the patient's vital signs.     Records Reviewed: Nursing Notes and Old Medical Records (Time of Review: 4:23 PM)    ED Course: Progress Notes, Reevaluation, and Consults:  4:40 PM: Discussed care with randi Martines. PROGRESS NOTE:  6:00 PM:  Patient care will be transferred to Julienne Andersen PA-C. Discussed available diagnostic results and care plan at length. Pending crisis assessment, placement for detox. Written by Julio Minaya PA-C      Diagnosis     Clinical Impression:   1. Alcohol abuse    2. Cocaine use        Disposition: TBD     Follow-up Information    None          Patient's Medications   Start Taking    No medications on file   Continue Taking    ACETAMINOPHEN (TYLENOL) 325 MG TABLET    Take 2 Tablets by mouth every four (4) hours as needed for Pain. BENZTROPINE (COGENTIN) 1 MG TABLET    Take 1 mg by mouth daily. HALOPERIDOL (HALDOL) 20 MG TABLET    Take 20 mg by mouth. HYDROXYZINE HCL (ATARAX) 50 MG TABLET    Take 100 mg by mouth daily. IBUPROFEN (MOTRIN) 600 MG TABLET    Take 1 Tablet by mouth every six (6) hours as needed for Pain. LIDOCAINE (LIDODERM) 5 %    Apply patch to the affected area for 12 hours a day and remove for 12 hours a day. LITHIUM CARBONATE 300 MG CAPSULE    Take 600 mg by mouth nightly. OLANZAPINE (ZYPREXA) 10 MG TABLET    Take 1 Tab by mouth nightly. Indications: Schizoaffective Disorder    RANITIDINE (ZANTAC) 150 MG TABLET    Take 150 mg by mouth daily. These Medications have changed    No medications on file   Stop Taking    No medications on file       Dictation disclaimer:  Please note that this dictation was completed with [employer], the computer voice recognition software. Quite often unanticipated grammatical, syntax, homophones, and other interpretive errors are inadvertently transcribed by the computer software. Please disregard these errors. Please excuse any errors that have escaped final proofreading.
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
## 2022-07-07 VITALS
TEMPERATURE: 98.3 F | OXYGEN SATURATION: 99 % | SYSTOLIC BLOOD PRESSURE: 125 MMHG | BODY MASS INDEX: 19.88 KG/M2 | DIASTOLIC BLOOD PRESSURE: 76 MMHG | RESPIRATION RATE: 20 BRPM | WEIGHT: 150 LBS | HEART RATE: 72 BPM | HEIGHT: 73 IN

## 2022-07-07 PROCEDURE — 94762 N-INVAS EAR/PLS OXIMTRY CONT: CPT

## 2022-07-07 NOTE — BSMART NOTE
Crisis Note: Writer spoke with Zulay Lo with [facility] [phone], clinicals submitted; awaiting response.

## 2022-07-07 NOTE — ED NOTES
2:08 AM pt located, now in the lobby, crisis will evaluate. Will turn over to night ED attending Dr. Percy Rogel. Julienne Andersen PA-C     Dictation disclaimer:  Please note that this dictation was completed with [employer], the computer voice recognition software. Quite often unanticipated grammatical, syntax, homophones, and other interpretive errors are inadvertently transcribed by the computer software. Please disregard these errors. Please excuse any errors that have escaped final proofreading.

## 2022-07-07 NOTE — BSMART NOTE
Crisis Note: On round, patient was assessed by this crisis worker. Patient is denying SI/HI/AVH. Patient is endorsing felling depressed. Patient is also endorsing negativecor beliefs about self. \" Im having thoughts like wishing I would go to asleep and not wake up and the world would be a better place without me. Patient is also endorsing visual hallucinations. Patient alleges he is seeing shadows and dark spots.
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
## 2022-10-01 NOTE — ED TRIAGE NOTES
Pt repots being here for alcohol and  Cocaine. He claims he tried to come earlier and left and went and smoke 2.5 grams of cocaine. No